# Patient Record
Sex: FEMALE | Race: WHITE | NOT HISPANIC OR LATINO | Employment: OTHER | URBAN - METROPOLITAN AREA
[De-identification: names, ages, dates, MRNs, and addresses within clinical notes are randomized per-mention and may not be internally consistent; named-entity substitution may affect disease eponyms.]

---

## 2017-01-01 ENCOUNTER — APPOINTMENT (OUTPATIENT)
Dept: RADIOLOGY | Facility: MEDICAL CENTER | Age: 67
DRG: 374 | End: 2017-01-01
Attending: HOSPITALIST
Payer: MEDICARE

## 2017-01-01 ENCOUNTER — APPOINTMENT (OUTPATIENT)
Dept: RADIOLOGY | Facility: MEDICAL CENTER | Age: 67
DRG: 374 | End: 2017-01-01
Attending: SURGERY
Payer: MEDICARE

## 2017-01-01 ENCOUNTER — HOSPITAL ENCOUNTER (OUTPATIENT)
Dept: CARDIOLOGY | Facility: MEDICAL CENTER | Age: 67
End: 2017-10-07
Attending: PHYSICIAN ASSISTANT
Payer: MEDICARE

## 2017-01-01 ENCOUNTER — TELEPHONE (OUTPATIENT)
Dept: MEDICAL GROUP | Facility: MEDICAL CENTER | Age: 67
End: 2017-01-01

## 2017-01-01 ENCOUNTER — HOSPITAL ENCOUNTER (INPATIENT)
Facility: MEDICAL CENTER | Age: 67
LOS: 4 days | DRG: 175 | End: 2017-10-24
Attending: EMERGENCY MEDICINE | Admitting: INTERNAL MEDICINE
Payer: MEDICARE

## 2017-01-01 ENCOUNTER — APPOINTMENT (OUTPATIENT)
Dept: RADIOLOGY | Facility: IMAGING CENTER | Age: 67
End: 2017-01-01
Attending: NURSE PRACTITIONER
Payer: MEDICARE

## 2017-01-01 ENCOUNTER — RESOLUTE PROFESSIONAL BILLING HOSPITAL PROF FEE (OUTPATIENT)
Dept: HOSPITALIST | Facility: MEDICAL CENTER | Age: 67
End: 2017-01-01
Payer: MEDICARE

## 2017-01-01 ENCOUNTER — HOSPITAL ENCOUNTER (OUTPATIENT)
Dept: LAB | Facility: MEDICAL CENTER | Age: 67
End: 2017-10-03
Attending: PHYSICIAN ASSISTANT
Payer: MEDICARE

## 2017-01-01 ENCOUNTER — APPOINTMENT (OUTPATIENT)
Dept: RADIOLOGY | Facility: MEDICAL CENTER | Age: 67
DRG: 175 | End: 2017-01-01
Attending: INTERNAL MEDICINE
Payer: MEDICARE

## 2017-01-01 ENCOUNTER — HOSPITAL ENCOUNTER (OUTPATIENT)
Dept: RADIOLOGY | Facility: MEDICAL CENTER | Age: 67
End: 2017-10-03
Attending: PHYSICIAN ASSISTANT
Payer: MEDICARE

## 2017-01-01 ENCOUNTER — HOSPITAL ENCOUNTER (OUTPATIENT)
Dept: LAB | Facility: MEDICAL CENTER | Age: 67
End: 2017-10-16
Attending: INTERNAL MEDICINE
Payer: MEDICARE

## 2017-01-01 ENCOUNTER — TELEPHONE (OUTPATIENT)
Dept: MEDICAL GROUP | Facility: PHYSICIAN GROUP | Age: 67
End: 2017-01-01

## 2017-01-01 ENCOUNTER — OFFICE VISIT (OUTPATIENT)
Dept: MEDICAL GROUP | Facility: MEDICAL CENTER | Age: 67
End: 2017-01-01
Payer: MEDICARE

## 2017-01-01 ENCOUNTER — APPOINTMENT (OUTPATIENT)
Dept: RADIOLOGY | Facility: MEDICAL CENTER | Age: 67
DRG: 374 | End: 2017-01-01
Attending: EMERGENCY MEDICINE
Payer: MEDICARE

## 2017-01-01 ENCOUNTER — HOSPITAL ENCOUNTER (OUTPATIENT)
Dept: LAB | Facility: MEDICAL CENTER | Age: 67
End: 2017-09-26
Attending: PHYSICIAN ASSISTANT
Payer: MEDICARE

## 2017-01-01 ENCOUNTER — HOSPITAL ENCOUNTER (INPATIENT)
Facility: MEDICAL CENTER | Age: 67
LOS: 1 days | DRG: 374 | End: 2017-10-26
Attending: EMERGENCY MEDICINE | Admitting: HOSPITALIST
Payer: MEDICARE

## 2017-01-01 ENCOUNTER — PATIENT OUTREACH (OUTPATIENT)
Dept: HEALTH INFORMATION MANAGEMENT | Facility: OTHER | Age: 67
End: 2017-01-01

## 2017-01-01 ENCOUNTER — OFFICE VISIT (OUTPATIENT)
Dept: URGENT CARE | Facility: PHYSICIAN GROUP | Age: 67
End: 2017-01-01
Payer: MEDICARE

## 2017-01-01 ENCOUNTER — HOSPITAL ENCOUNTER (OUTPATIENT)
Dept: RADIOLOGY | Facility: MEDICAL CENTER | Age: 67
End: 2017-10-20
Attending: INTERNAL MEDICINE
Payer: MEDICARE

## 2017-01-01 VITALS
HEART RATE: 101 BPM | BODY MASS INDEX: 36.07 KG/M2 | HEIGHT: 68 IN | TEMPERATURE: 97.3 F | OXYGEN SATURATION: 97 % | SYSTOLIC BLOOD PRESSURE: 108 MMHG | WEIGHT: 238 LBS | DIASTOLIC BLOOD PRESSURE: 64 MMHG

## 2017-01-01 VITALS
RESPIRATION RATE: 20 BRPM | DIASTOLIC BLOOD PRESSURE: 68 MMHG | BODY MASS INDEX: 34.4 KG/M2 | TEMPERATURE: 99 F | HEIGHT: 68 IN | HEART RATE: 104 BPM | OXYGEN SATURATION: 95 % | SYSTOLIC BLOOD PRESSURE: 110 MMHG | WEIGHT: 227 LBS

## 2017-01-01 VITALS
HEART RATE: 86 BPM | OXYGEN SATURATION: 93 % | TEMPERATURE: 97.7 F | RESPIRATION RATE: 18 BRPM | DIASTOLIC BLOOD PRESSURE: 55 MMHG | SYSTOLIC BLOOD PRESSURE: 92 MMHG | WEIGHT: 231.48 LBS | HEIGHT: 68 IN | BODY MASS INDEX: 35.08 KG/M2

## 2017-01-01 VITALS
DIASTOLIC BLOOD PRESSURE: 60 MMHG | RESPIRATION RATE: 12 BRPM | TEMPERATURE: 98.3 F | HEIGHT: 68 IN | OXYGEN SATURATION: 97 % | HEART RATE: 74 BPM | BODY MASS INDEX: 36.07 KG/M2 | SYSTOLIC BLOOD PRESSURE: 126 MMHG | WEIGHT: 238 LBS

## 2017-01-01 VITALS
RESPIRATION RATE: 16 BRPM | DIASTOLIC BLOOD PRESSURE: 64 MMHG | BODY MASS INDEX: 35.64 KG/M2 | HEIGHT: 68 IN | HEART RATE: 100 BPM | WEIGHT: 235.2 LBS | OXYGEN SATURATION: 93 % | SYSTOLIC BLOOD PRESSURE: 102 MMHG | TEMPERATURE: 98.8 F

## 2017-01-01 VITALS
HEART RATE: 98 BPM | WEIGHT: 266.76 LBS | SYSTOLIC BLOOD PRESSURE: 86 MMHG | RESPIRATION RATE: 29 BRPM | BODY MASS INDEX: 40.43 KG/M2 | OXYGEN SATURATION: 97 % | HEIGHT: 68 IN | TEMPERATURE: 97.9 F | DIASTOLIC BLOOD PRESSURE: 27 MMHG

## 2017-01-01 DIAGNOSIS — R74.8 ELEVATED ALKALINE PHOSPHATASE LEVEL: ICD-10-CM

## 2017-01-01 DIAGNOSIS — R74.01 TRANSAMINITIS: ICD-10-CM

## 2017-01-01 DIAGNOSIS — R73.03 PREDIABETES: ICD-10-CM

## 2017-01-01 DIAGNOSIS — S51.001A OPEN WOUND OF RIGHT ELBOW, INITIAL ENCOUNTER: ICD-10-CM

## 2017-01-01 DIAGNOSIS — R60.9 EDEMA, UNSPECIFIED TYPE: ICD-10-CM

## 2017-01-01 DIAGNOSIS — S50.01XA CONTUSION OF ELBOW, RIGHT: ICD-10-CM

## 2017-01-01 DIAGNOSIS — R60.0 LOWER LEG EDEMA: ICD-10-CM

## 2017-01-01 DIAGNOSIS — R10.9 STOMACH ACHE: ICD-10-CM

## 2017-01-01 DIAGNOSIS — I26.99 OTHER ACUTE PULMONARY EMBOLISM WITHOUT ACUTE COR PULMONALE (HCC): ICD-10-CM

## 2017-01-01 DIAGNOSIS — C79.9 METASTATIC CANCER (HCC): ICD-10-CM

## 2017-01-01 DIAGNOSIS — Z12.31 ENCOUNTER FOR SCREENING MAMMOGRAM FOR MALIGNANT NEOPLASM OF BREAST: ICD-10-CM

## 2017-01-01 DIAGNOSIS — Z12.11 SCREENING FOR MALIGNANT NEOPLASM OF COLON: ICD-10-CM

## 2017-01-01 DIAGNOSIS — Z48.02 ENCOUNTER FOR REMOVAL OF SUTURES: ICD-10-CM

## 2017-01-01 DIAGNOSIS — M25.521 ELBOW PAIN, RIGHT: ICD-10-CM

## 2017-01-01 DIAGNOSIS — H35.30 MACULAR DEGENERATION: ICD-10-CM

## 2017-01-01 DIAGNOSIS — M89.9 OSTEOPATHY: ICD-10-CM
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
## 2017-01-01 DIAGNOSIS — J98.4 DISEASE OF LUNG: ICD-10-CM

## 2017-01-01 LAB
A1AT SERPL-MCNC: 291 MG/DL (ref 90–200)
AFP-TM SERPL-MCNC: 2 NG/ML (ref 0–9)
ALBUMIN SERPL BCP-MCNC: 2 G/DL (ref 3.2–4.9)
ALBUMIN SERPL BCP-MCNC: 2.2 G/DL (ref 3.2–4.9)
ALBUMIN SERPL BCP-MCNC: 2.3 G/DL (ref 3.2–4.9)
ALBUMIN SERPL BCP-MCNC: 2.7 G/DL (ref 3.2–4.9)
ALBUMIN SERPL BCP-MCNC: 2.9 G/DL (ref 3.2–4.9)
ALBUMIN/GLOB SERPL: 0.6 G/DL
ALBUMIN/GLOB SERPL: 0.8 G/DL
ALBUMIN/GLOB SERPL: 0.9 G/DL
ALP SERPL-CCNC: 527 U/L (ref 30–99)
ALP SERPL-CCNC: 558 U/L (ref 30–99)
ALP SERPL-CCNC: 589 U/L (ref 30–99)
ALP SERPL-CCNC: 590 U/L (ref 30–99)
ALP SERPL-CCNC: 613 U/L (ref 30–99)
ALP SERPL-CCNC: 752 U/L (ref 30–99)
ALP SERPL-CCNC: 857 U/L (ref 30–99)
ALT SERPL-CCNC: 190 U/L (ref 2–50)
ALT SERPL-CCNC: 47 U/L (ref 2–50)
ALT SERPL-CCNC: 67 U/L (ref 2–50)
ALT SERPL-CCNC: 706 U/L (ref 2–50)
ALT SERPL-CCNC: 784 U/L (ref 2–50)
ALT SERPL-CCNC: 87 U/L (ref 2–50)
ALT SERPL-CCNC: 89 U/L (ref 2–50)
AMMONIA PLAS-SCNC: 93 UMOL/L (ref 11–45)
ANION GAP SERPL CALC-SCNC: 13 MMOL/L (ref 0–11.9)
ANION GAP SERPL CALC-SCNC: 14 MMOL/L (ref 0–11.9)
ANION GAP SERPL CALC-SCNC: 14 MMOL/L (ref 0–11.9)
ANION GAP SERPL CALC-SCNC: 15 MMOL/L (ref 0–11.9)
ANION GAP SERPL CALC-SCNC: 18 MMOL/L (ref 0–11.9)
ANION GAP SERPL CALC-SCNC: 23 MMOL/L (ref 0–11.9)
ANION GAP SERPL CALC-SCNC: 9 MMOL/L (ref 0–11.9)
ANISOCYTOSIS BLD QL SMEAR: ABNORMAL
APPEARANCE UR: CLEAR
APTT PPP: 28.4 SEC (ref 24.7–36)
APTT PPP: 72.8 SEC (ref 24.7–36)
AST SERPL-CCNC: 150 U/L (ref 12–45)
AST SERPL-CCNC: 217 U/L (ref 12–45)
AST SERPL-CCNC: 228 U/L (ref 12–45)
AST SERPL-CCNC: 249 U/L (ref 12–45)
AST SERPL-CCNC: 2803 U/L (ref 12–45)
AST SERPL-CCNC: 2843 U/L (ref 12–45)
AST SERPL-CCNC: 750 U/L (ref 12–45)
BACTERIA #/AREA URNS HPF: ABNORMAL /HPF
BASOPHILS # BLD AUTO: 0 % (ref 0–1.8)
BASOPHILS # BLD AUTO: 0.3 % (ref 0–1.8)
BASOPHILS # BLD AUTO: 0.4 % (ref 0–1.8)
BASOPHILS # BLD AUTO: 0.9 % (ref 0–1.8)
BASOPHILS # BLD: 0 K/UL (ref 0–0.12)
BASOPHILS # BLD: 0.05 K/UL (ref 0–0.12)
BASOPHILS # BLD: 0.06 K/UL (ref 0–0.12)
BASOPHILS # BLD: 0.24 K/UL (ref 0–0.12)
BILIRUB SERPL-MCNC: 1 MG/DL (ref 0.1–1.5)
BILIRUB SERPL-MCNC: 10.3 MG/DL (ref 0.1–1.5)
BILIRUB SERPL-MCNC: 4.3 MG/DL (ref 0.1–1.5)
BILIRUB SERPL-MCNC: 6.8 MG/DL (ref 0.1–1.5)
BILIRUB SERPL-MCNC: 6.8 MG/DL (ref 0.1–1.5)
BILIRUB SERPL-MCNC: 7.9 MG/DL (ref 0.1–1.5)
BILIRUB SERPL-MCNC: 8.8 MG/DL (ref 0.1–1.5)
BILIRUB UR QL STRIP.AUTO: NEGATIVE
BNP SERPL-MCNC: 104 PG/ML (ref 0–100)
BUN SERPL-MCNC: 12 MG/DL (ref 8–22)
BUN SERPL-MCNC: 22 MG/DL (ref 8–22)
BUN SERPL-MCNC: 29 MG/DL (ref 8–22)
BUN SERPL-MCNC: 29 MG/DL (ref 8–22)
BUN SERPL-MCNC: 41 MG/DL (ref 8–22)
BUN SERPL-MCNC: 59 MG/DL (ref 8–22)
BUN SERPL-MCNC: 61 MG/DL (ref 8–22)
BURR CELLS BLD QL SMEAR: NORMAL
BURR CELLS BLD QL SMEAR: NORMAL
CALCIUM SERPL-MCNC: 6.5 MG/DL (ref 8.5–10.5)
CALCIUM SERPL-MCNC: 6.8 MG/DL (ref 8.5–10.5)
CALCIUM SERPL-MCNC: 8.3 MG/DL (ref 8.4–10.2)
CALCIUM SERPL-MCNC: 8.6 MG/DL (ref 8.4–10.2)
CALCIUM SERPL-MCNC: 8.6 MG/DL (ref 8.4–10.2)
CALCIUM SERPL-MCNC: 8.7 MG/DL (ref 8.5–10.5)
CALCIUM SERPL-MCNC: 8.9 MG/DL (ref 8.5–10.5)
CEA SERPL-MCNC: 1586.7 NG/ML (ref 0–3)
CHLORIDE SERPL-SCNC: 103 MMOL/L (ref 96–112)
CHLORIDE SERPL-SCNC: 92 MMOL/L (ref 96–112)
CHLORIDE SERPL-SCNC: 94 MMOL/L (ref 96–112)
CHLORIDE SERPL-SCNC: 95 MMOL/L (ref 96–112)
CHLORIDE SERPL-SCNC: 97 MMOL/L (ref 96–112)
CHLORIDE SERPL-SCNC: 97 MMOL/L (ref 96–112)
CHLORIDE SERPL-SCNC: 98 MMOL/L (ref 96–112)
CHOLEST SERPL-MCNC: 289 MG/DL (ref 100–199)
CK SERPL-CCNC: 1264 U/L (ref 0–154)
CK SERPL-CCNC: 1277 U/L (ref 0–154)
CO2 SERPL-SCNC: 13 MMOL/L (ref 20–33)
CO2 SERPL-SCNC: 14 MMOL/L (ref 20–33)
CO2 SERPL-SCNC: 22 MMOL/L (ref 20–33)
CO2 SERPL-SCNC: 22 MMOL/L (ref 20–33)
CO2 SERPL-SCNC: 25 MMOL/L (ref 20–33)
CO2 SERPL-SCNC: 26 MMOL/L (ref 20–33)
CO2 SERPL-SCNC: 26 MMOL/L (ref 20–33)
COLOR UR: ABNORMAL
CORTIS SERPL-MCNC: 35.2 UG/DL (ref 0–23)
CREAT SERPL-MCNC: 0.63 MG/DL (ref 0.5–1.4)
CREAT SERPL-MCNC: 0.8 MG/DL (ref 0.5–1.4)
CREAT SERPL-MCNC: 1.03 MG/DL (ref 0.5–1.4)
CREAT SERPL-MCNC: 1.22 MG/DL (ref 0.5–1.4)
CREAT SERPL-MCNC: 1.32 MG/DL (ref 0.5–1.4)
CREAT SERPL-MCNC: 2.8 MG/DL (ref 0.5–1.4)
CREAT SERPL-MCNC: 3.24 MG/DL (ref 0.5–1.4)
EKG IMPRESSION: NORMAL
EKG IMPRESSION: NORMAL
EOSINOPHIL # BLD AUTO: 0 K/UL (ref 0–0.51)
EOSINOPHIL # BLD AUTO: 0 K/UL (ref 0–0.51)
EOSINOPHIL # BLD AUTO: 0.06 K/UL (ref 0–0.51)
EOSINOPHIL # BLD AUTO: 0.08 K/UL (ref 0–0.51)
EOSINOPHIL # BLD AUTO: 0.18 K/UL (ref 0–0.51)
EOSINOPHIL # BLD AUTO: 0.24 K/UL (ref 0–0.51)
EOSINOPHIL NFR BLD: 0 % (ref 0–6.9)
EOSINOPHIL NFR BLD: 0 % (ref 0–6.9)
EOSINOPHIL NFR BLD: 0.4 % (ref 0–6.9)
EOSINOPHIL NFR BLD: 0.5 % (ref 0–6.9)
EOSINOPHIL NFR BLD: 0.9 % (ref 0–6.9)
EOSINOPHIL NFR BLD: 1 % (ref 0–6.9)
EPI CELLS #/AREA URNS HPF: NEGATIVE /HPF
ERYTHROCYTE [DISTWIDTH] IN BLOOD BY AUTOMATED COUNT: 54 FL (ref 35.9–50)
ERYTHROCYTE [DISTWIDTH] IN BLOOD BY AUTOMATED COUNT: 54.1 FL (ref 35.9–50)
ERYTHROCYTE [DISTWIDTH] IN BLOOD BY AUTOMATED COUNT: 55.8 FL (ref 35.9–50)
ERYTHROCYTE [DISTWIDTH] IN BLOOD BY AUTOMATED COUNT: 55.8 FL (ref 35.9–50)
ERYTHROCYTE [DISTWIDTH] IN BLOOD BY AUTOMATED COUNT: 56.4 FL (ref 35.9–50)
ERYTHROCYTE [DISTWIDTH] IN BLOOD BY AUTOMATED COUNT: 58.6 FL (ref 35.9–50)
ERYTHROCYTE [DISTWIDTH] IN BLOOD BY AUTOMATED COUNT: 59.8 FL (ref 35.9–50)
FERRITIN SERPL-MCNC: 21.7 NG/ML (ref 10–291)
GFR SERPL CREATININE-BSD FRML MDRD: 14 ML/MIN/1.73 M 2
GFR SERPL CREATININE-BSD FRML MDRD: 17 ML/MIN/1.73 M 2
GFR SERPL CREATININE-BSD FRML MDRD: 40 ML/MIN/1.73 M 2
GFR SERPL CREATININE-BSD FRML MDRD: 44 ML/MIN/1.73 M 2
GFR SERPL CREATININE-BSD FRML MDRD: 53 ML/MIN/1.73 M 2
GFR SERPL CREATININE-BSD FRML MDRD: >60 ML/MIN/1.73 M 2
GFR SERPL CREATININE-BSD FRML MDRD: >60 ML/MIN/1.73 M 2
GLOBULIN SER CALC-MCNC: 2.5 G/DL (ref 1.9–3.5)
GLOBULIN SER CALC-MCNC: 2.7 G/DL (ref 1.9–3.5)
GLOBULIN SER CALC-MCNC: 3.2 G/DL (ref 1.9–3.5)
GLOBULIN SER CALC-MCNC: 3.4 G/DL (ref 1.9–3.5)
GLOBULIN SER CALC-MCNC: 3.6 G/DL (ref 1.9–3.5)
GLOBULIN SER CALC-MCNC: 3.7 G/DL (ref 1.9–3.5)
GLOBULIN SER CALC-MCNC: 3.8 G/DL (ref 1.9–3.5)
GLUCOSE SERPL-MCNC: 102 MG/DL (ref 65–99)
GLUCOSE SERPL-MCNC: 105 MG/DL (ref 65–99)
GLUCOSE SERPL-MCNC: 78 MG/DL (ref 65–99)
GLUCOSE SERPL-MCNC: 80 MG/DL (ref 65–99)
GLUCOSE SERPL-MCNC: 83 MG/DL (ref 65–99)
GLUCOSE SERPL-MCNC: 93 MG/DL (ref 65–99)
GLUCOSE SERPL-MCNC: 97 MG/DL (ref 65–99)
GLUCOSE UR STRIP.AUTO-MCNC: NEGATIVE MG/DL
HAV AB SER QL IA: POSITIVE
HBA1C MFR BLD: 5.6 % (ref ?–5.8)
HBV SURFACE AB SERPL IA-ACNC: <3.1 MIU/ML (ref 0–10)
HBV SURFACE AG SER QL: NEGATIVE
HCT VFR BLD AUTO: 29.4 % (ref 37–47)
HCT VFR BLD AUTO: 29.7 % (ref 37–47)
HCT VFR BLD AUTO: 30.6 % (ref 37–47)
HCT VFR BLD AUTO: 31 % (ref 37–47)
HCT VFR BLD AUTO: 31.1 % (ref 37–47)
HCT VFR BLD AUTO: 31.7 % (ref 37–47)
HCT VFR BLD AUTO: 31.8 % (ref 37–47)
HCV AB SER QL: NEGATIVE
HDLC SERPL-MCNC: 23 MG/DL
HGB BLD-MCNC: 8.9 G/DL (ref 12–16)
HGB BLD-MCNC: 8.9 G/DL (ref 12–16)
HGB BLD-MCNC: 9.1 G/DL (ref 12–16)
HGB BLD-MCNC: 9.3 G/DL (ref 12–16)
HGB BLD-MCNC: 9.3 G/DL (ref 12–16)
HGB BLD-MCNC: 9.4 G/DL (ref 12–16)
HGB BLD-MCNC: 9.6 G/DL (ref 12–16)
HGB RETIC QN AUTO: 24.8 PG/CELL (ref 29–35)
HYALINE CASTS #/AREA URNS LPF: ABNORMAL /LPF
HYPOCHROMIA BLD QL SMEAR: ABNORMAL
IGA SERPL-MCNC: 287 MG/DL (ref 68–408)
IMM GRANULOCYTES # BLD AUTO: 0.24 K/UL (ref 0–0.11)
IMM GRANULOCYTES # BLD AUTO: 0.55 K/UL (ref 0–0.11)
IMM GRANULOCYTES NFR BLD AUTO: 1.4 % (ref 0–0.9)
IMM GRANULOCYTES NFR BLD AUTO: 3.3 % (ref 0–0.9)
IMM RETICS NFR: 36.4 % (ref 9.3–17.4)
INR PPP: 1.24 (ref 0.87–1.13)
INR PPP: 1.3 (ref 0.87–1.13)
INR PPP: 1.52 (ref 0.87–1.13)
INT CON NEG: NORMAL
INT CON POS: NORMAL
IRON SATN MFR SERPL: 6 % (ref 15–55)
IRON SERPL-MCNC: 22 UG/DL (ref 40–170)
KETONES UR STRIP.AUTO-MCNC: NEGATIVE MG/DL
LACTATE BLD-SCNC: 5 MMOL/L (ref 0.5–2)
LACTATE BLD-SCNC: 5.5 MMOL/L (ref 0.5–2)
LACTATE BLD-SCNC: 5.7 MMOL/L (ref 0.5–2)
LACTATE BLD-SCNC: 6.4 MMOL/L (ref 0.5–2)
LACTATE BLD-SCNC: 8.4 MMOL/L (ref 0.5–2)
LDLC SERPL CALC-MCNC: 236 MG/DL
LEUKOCYTE ESTERASE UR QL STRIP.AUTO: NEGATIVE
LIPASE SERPL-CCNC: 48 U/L (ref 11–82)
LV EJECT FRACT  99904: 65
LV EJECT FRACT MOD 2C 99903: 40.54
LV EJECT FRACT MOD 4C 99902: 73.82
LV EJECT FRACT MOD BP 99901: 61.74
LYMPHOCYTES # BLD AUTO: 0.65 K/UL (ref 1–4.8)
LYMPHOCYTES # BLD AUTO: 0.83 K/UL (ref 1–4.8)
LYMPHOCYTES # BLD AUTO: 0.99 K/UL (ref 1–4.8)
LYMPHOCYTES # BLD AUTO: 1.95 K/UL (ref 1–4.8)
LYMPHOCYTES # BLD AUTO: 2.18 K/UL (ref 1–4.8)
LYMPHOCYTES # BLD AUTO: 2.59 K/UL (ref 1–4.8)
LYMPHOCYTES NFR BLD: 12 % (ref 22–41)
LYMPHOCYTES NFR BLD: 5 % (ref 22–41)
LYMPHOCYTES NFR BLD: 5.3 % (ref 22–41)
LYMPHOCYTES NFR BLD: 5.9 % (ref 22–41)
LYMPHOCYTES NFR BLD: 8 % (ref 22–41)
LYMPHOCYTES NFR BLD: 9.6 % (ref 22–41)
MACROCYTES BLD QL SMEAR: ABNORMAL
MAGNESIUM SERPL-MCNC: 2.1 MG/DL (ref 1.5–2.5)
MANUAL DIFF BLD: ABNORMAL
MANUAL DIFF BLD: NORMAL
MCH RBC QN AUTO: 23.2 PG (ref 27–33)
MCH RBC QN AUTO: 23.3 PG (ref 27–33)
MCH RBC QN AUTO: 23.3 PG (ref 27–33)
MCH RBC QN AUTO: 23.6 PG (ref 27–33)
MCH RBC QN AUTO: 23.7 PG (ref 27–33)
MCH RBC QN AUTO: 23.8 PG (ref 27–33)
MCH RBC QN AUTO: 24.3 PG (ref 27–33)
MCHC RBC AUTO-ENTMCNC: 28.7 G/DL (ref 33.6–35)
MCHC RBC AUTO-ENTMCNC: 29.2 G/DL (ref 33.6–35)
MCHC RBC AUTO-ENTMCNC: 30.2 G/DL (ref 33.6–35)
MCHC RBC AUTO-ENTMCNC: 30.3 G/DL (ref 33.6–35)
MCHC RBC AUTO-ENTMCNC: 30.3 G/DL (ref 33.6–35)
MCHC RBC AUTO-ENTMCNC: 30.4 G/DL (ref 33.6–35)
MCHC RBC AUTO-ENTMCNC: 30.6 G/DL (ref 33.6–35)
MCV RBC AUTO: 76.2 FL (ref 81.4–97.8)
MCV RBC AUTO: 76.8 FL (ref 81.4–97.8)
MCV RBC AUTO: 77.9 FL (ref 81.4–97.8)
MCV RBC AUTO: 77.9 FL (ref 81.4–97.8)
MCV RBC AUTO: 79.5 FL (ref 81.4–97.8)
MCV RBC AUTO: 80.1 FL (ref 81.4–97.8)
MCV RBC AUTO: 82.9 FL (ref 81.4–97.8)
METAMYELOCYTES NFR BLD MANUAL: 1 %
METAMYELOCYTES NFR BLD MANUAL: 2 %
METAMYELOCYTES NFR BLD MANUAL: 2.6 %
MICRO URNS: ABNORMAL
MITOCHONDRIA M2 IGG SER-ACNC: 3.1 UNITS (ref 0–20)
MONOCYTES # BLD AUTO: 0.65 K/UL (ref 0–0.85)
MONOCYTES # BLD AUTO: 0.91 K/UL (ref 0–0.85)
MONOCYTES # BLD AUTO: 1.16 K/UL (ref 0–0.85)
MONOCYTES # BLD AUTO: 1.27 K/UL (ref 0–0.85)
MONOCYTES # BLD AUTO: 1.62 K/UL (ref 0–0.85)
MONOCYTES # BLD AUTO: 1.71 K/UL (ref 0–0.85)
MONOCYTES NFR BLD AUTO: 4.3 % (ref 0–13.4)
MONOCYTES NFR BLD AUTO: 5 % (ref 0–13.4)
MONOCYTES NFR BLD AUTO: 5.3 % (ref 0–13.4)
MONOCYTES NFR BLD AUTO: 7 % (ref 0–13.4)
MONOCYTES NFR BLD AUTO: 7.7 % (ref 0–13.4)
MONOCYTES NFR BLD AUTO: 9.7 % (ref 0–13.4)
MORPHOLOGY BLD-IMP: NORMAL
MORPHOLOGY BLD-IMP: NORMAL
MYELOCYTES NFR BLD MANUAL: 1 %
MYELOCYTES NFR BLD MANUAL: 1.7 %
NEUTROPHILS # BLD AUTO: 10.79 K/UL (ref 2–7.15)
NEUTROPHILS # BLD AUTO: 13.78 K/UL (ref 2–7.15)
NEUTROPHILS # BLD AUTO: 13.81 K/UL (ref 2–7.15)
NEUTROPHILS # BLD AUTO: 14.74 K/UL (ref 2–7.15)
NEUTROPHILS # BLD AUTO: 20.01 K/UL (ref 2–7.15)
NEUTROPHILS # BLD AUTO: 21.36 K/UL (ref 2–7.15)
NEUTROPHILS NFR BLD: 72.2 % (ref 44–72)
NEUTROPHILS NFR BLD: 74 % (ref 44–72)
NEUTROPHILS NFR BLD: 76 % (ref 44–72)
NEUTROPHILS NFR BLD: 82.2 % (ref 44–72)
NEUTROPHILS NFR BLD: 83.2 % (ref 44–72)
NEUTROPHILS NFR BLD: 87.7 % (ref 44–72)
NEUTS BAND NFR BLD MANUAL: 5 % (ref 0–10)
NEUTS BAND NFR BLD MANUAL: 6.9 % (ref 0–10)
NEUTS BAND NFR BLD MANUAL: 8 % (ref 0–10)
NITRITE UR QL STRIP.AUTO: NEGATIVE
NRBC # BLD AUTO: 0 K/UL
NRBC # BLD AUTO: 0.52 K/UL
NRBC # BLD AUTO: 2.25 K/UL
NRBC BLD AUTO-RTO: 0 /100 WBC
NRBC BLD AUTO-RTO: 2.1 /100 WBC
NRBC BLD AUTO-RTO: 8.3 /100 WBC
NUCLEAR IGG SER QL IA: NORMAL
PATHOLOGY CONSULT NOTE: NORMAL
PH UR STRIP.AUTO: 7 [PH]
PLATELET # BLD AUTO: 210 K/UL (ref 164–446)
PLATELET # BLD AUTO: 224 K/UL (ref 164–446)
PLATELET # BLD AUTO: 226 K/UL (ref 164–446)
PLATELET # BLD AUTO: 244 K/UL (ref 164–446)
PLATELET # BLD AUTO: 264 K/UL (ref 164–446)
PLATELET # BLD AUTO: 266 K/UL (ref 164–446)
PLATELET # BLD AUTO: 305 K/UL (ref 164–446)
PLATELET BLD QL SMEAR: NORMAL
PMV BLD AUTO: 10 FL (ref 9–12.9)
PMV BLD AUTO: 10.1 FL (ref 9–12.9)
PMV BLD AUTO: 10.1 FL (ref 9–12.9)
PMV BLD AUTO: 10.6 FL (ref 9–12.9)
PMV BLD AUTO: 9.5 FL (ref 9–12.9)
PMV BLD AUTO: 9.5 FL (ref 9–12.9)
PMV BLD AUTO: 9.7 FL (ref 9–12.9)
POIKILOCYTOSIS BLD QL SMEAR: NORMAL
POIKILOCYTOSIS BLD QL SMEAR: NORMAL
POLYCHROMASIA BLD QL SMEAR: NORMAL
POTASSIUM SERPL-SCNC: 3.7 MMOL/L (ref 3.6–5.5)
POTASSIUM SERPL-SCNC: 3.8 MMOL/L (ref 3.6–5.5)
POTASSIUM SERPL-SCNC: 3.9 MMOL/L (ref 3.6–5.5)
POTASSIUM SERPL-SCNC: 4.3 MMOL/L (ref 3.6–5.5)
POTASSIUM SERPL-SCNC: 4.3 MMOL/L (ref 3.6–5.5)
POTASSIUM SERPL-SCNC: 5.3 MMOL/L (ref 3.6–5.5)
POTASSIUM SERPL-SCNC: 5.5 MMOL/L (ref 3.6–5.5)
PROMYELOCYTES NFR BLD MANUAL: 0.9 %
PROMYELOCYTES NFR BLD MANUAL: 1.7 %
PROT SERPL-MCNC: 4.7 G/DL (ref 6–8.2)
PROT SERPL-MCNC: 5 G/DL (ref 6–8.2)
PROT SERPL-MCNC: 5.6 G/DL (ref 6–8.2)
PROT SERPL-MCNC: 5.9 G/DL (ref 6–8.2)
PROT SERPL-MCNC: 6 G/DL (ref 6–8.2)
PROT SERPL-MCNC: 6.1 G/DL (ref 6–8.2)
PROT SERPL-MCNC: 6.3 G/DL (ref 6–8.2)
PROT UR QL STRIP: 300 MG/DL
PROTHROMBIN TIME: 16 SEC (ref 12–14.6)
PROTHROMBIN TIME: 16 SEC (ref 12–14.6)
PROTHROMBIN TIME: 18.1 SEC (ref 12–14.6)
RBC # BLD AUTO: 3.67 M/UL (ref 4.2–5.4)
RBC # BLD AUTO: 3.74 M/UL (ref 4.2–5.4)
RBC # BLD AUTO: 3.9 M/UL (ref 4.2–5.4)
RBC # BLD AUTO: 3.93 M/UL (ref 4.2–5.4)
RBC # BLD AUTO: 3.99 M/UL (ref 4.2–5.4)
RBC # BLD AUTO: 4 M/UL (ref 4.2–5.4)
RBC # BLD AUTO: 4.13 M/UL (ref 4.2–5.4)
RBC # URNS HPF: ABNORMAL /HPF
RBC BLD AUTO: PRESENT
RBC UR QL AUTO: ABNORMAL
RETICS # AUTO: 0.15 M/UL (ref 0.04–0.06)
RETICS/RBC NFR: 3.9 % (ref 0.8–2.1)
SCHISTOCYTES BLD QL SMEAR: NORMAL
SMA IGG SER-ACNC: 9 UNITS (ref 0–19)
SODIUM SERPL-SCNC: 129 MMOL/L (ref 135–145)
SODIUM SERPL-SCNC: 130 MMOL/L (ref 135–145)
SODIUM SERPL-SCNC: 132 MMOL/L (ref 135–145)
SODIUM SERPL-SCNC: 132 MMOL/L (ref 135–145)
SODIUM SERPL-SCNC: 133 MMOL/L (ref 135–145)
SODIUM SERPL-SCNC: 137 MMOL/L (ref 135–145)
SODIUM SERPL-SCNC: 137 MMOL/L (ref 135–145)
SP GR UR STRIP.AUTO: 1.01
TIBC SERPL-MCNC: 339 UG/DL (ref 250–450)
TRIGL SERPL-MCNC: 150 MG/DL (ref 0–149)
TROPONIN I SERPL-MCNC: 0.04 NG/ML (ref 0–0.04)
TSH SERPL DL<=0.005 MIU/L-ACNC: 3.16 UIU/ML (ref 0.3–3.7)
TTG IGA SER IA-ACNC: 1 U/ML (ref 0–3)
UROBILINOGEN UR STRIP.AUTO-MCNC: 0.2 MG/DL
WBC # BLD AUTO: 12.3 K/UL (ref 4.8–10.8)
WBC # BLD AUTO: 16.6 K/UL (ref 4.8–10.8)
WBC # BLD AUTO: 16.8 K/UL (ref 4.8–10.8)
WBC # BLD AUTO: 18.2 K/UL (ref 4.8–10.8)
WBC # BLD AUTO: 20.5 K/UL (ref 4.8–10.8)
WBC # BLD AUTO: 24.4 K/UL (ref 4.8–10.8)
WBC # BLD AUTO: 27 K/UL (ref 4.8–10.8)
WBC #/AREA URNS HPF: ABNORMAL /HPF

## 2017-01-01 PROCEDURE — 99203 OFFICE O/P NEW LOW 30 MIN: CPT | Performed by: PHYSICIAN ASSISTANT

## 2017-01-01 PROCEDURE — 3E013GC INTRODUCTION OF OTHER THERAPEUTIC SUBSTANCE INTO SUBCUTANEOUS TISSUE, PERCUTANEOUS APPROACH: ICD-10-PCS | Performed by: INTERNAL MEDICINE

## 2017-01-01 PROCEDURE — 99232 SBSQ HOSP IP/OBS MODERATE 35: CPT | Performed by: HOSPITALIST

## 2017-01-01 PROCEDURE — 86255 FLUORESCENT ANTIBODY SCREEN: CPT

## 2017-01-01 PROCEDURE — 02HV33Z INSERTION OF INFUSION DEVICE INTO SUPERIOR VENA CAVA, PERCUTANEOUS APPROACH: ICD-10-PCS | Performed by: SURGERY

## 2017-01-01 PROCEDURE — 770020 HCHG ROOM/CARE - TELE (206)

## 2017-01-01 PROCEDURE — 700105 HCHG RX REV CODE 258: Performed by: INTERNAL MEDICINE

## 2017-01-01 PROCEDURE — 85027 COMPLETE CBC AUTOMATED: CPT

## 2017-01-01 PROCEDURE — 99233 SBSQ HOSP IP/OBS HIGH 50: CPT | Performed by: HOSPITALIST

## 2017-01-01 PROCEDURE — A9270 NON-COVERED ITEM OR SERVICE: HCPCS | Performed by: HOSPITALIST

## 2017-01-01 PROCEDURE — 82550 ASSAY OF CK (CPK): CPT

## 2017-01-01 PROCEDURE — 84443 ASSAY THYROID STIM HORMONE: CPT

## 2017-01-01 PROCEDURE — 85007 BL SMEAR W/DIFF WBC COUNT: CPT

## 2017-01-01 PROCEDURE — 82378 CARCINOEMBRYONIC ANTIGEN: CPT | Mod: GA

## 2017-01-01 PROCEDURE — 97535 SELF CARE MNGMENT TRAINING: CPT

## 2017-01-01 PROCEDURE — C1751 CATH, INF, PER/CENT/MIDLINE: HCPCS | Performed by: EMERGENCY MEDICINE

## 2017-01-01 PROCEDURE — 80053 COMPREHEN METABOLIC PANEL: CPT

## 2017-01-01 PROCEDURE — 700102 HCHG RX REV CODE 250 W/ 637 OVERRIDE(OP): Performed by: HOSPITALIST

## 2017-01-01 PROCEDURE — 76705 ECHO EXAM OF ABDOMEN: CPT

## 2017-01-01 PROCEDURE — 84484 ASSAY OF TROPONIN QUANT: CPT

## 2017-01-01 PROCEDURE — 85025 COMPLETE CBC W/AUTO DIFF WBC: CPT

## 2017-01-01 PROCEDURE — 700111 HCHG RX REV CODE 636 W/ 250 OVERRIDE (IP): Performed by: INTERNAL MEDICINE

## 2017-01-01 PROCEDURE — 700111 HCHG RX REV CODE 636 W/ 250 OVERRIDE (IP): Performed by: EMERGENCY MEDICINE

## 2017-01-01 PROCEDURE — 99291 CRITICAL CARE FIRST HOUR: CPT | Performed by: HOSPITALIST

## 2017-01-01 PROCEDURE — A9270 NON-COVERED ITEM OR SERVICE: HCPCS | Performed by: INTERNAL MEDICINE

## 2017-01-01 PROCEDURE — 700105 HCHG RX REV CODE 258: Performed by: EMERGENCY MEDICINE

## 2017-01-01 PROCEDURE — 96375 TX/PRO/DX INJ NEW DRUG ADDON: CPT

## 2017-01-01 PROCEDURE — 700101 HCHG RX REV CODE 250: Performed by: HOSPITALIST

## 2017-01-01 PROCEDURE — 94760 N-INVAS EAR/PLS OXIMETRY 1: CPT

## 2017-01-01 PROCEDURE — 82105 ALPHA-FETOPROTEIN SERUM: CPT | Mod: GA

## 2017-01-01 PROCEDURE — 74178 CT ABD&PLV WO CNTR FLWD CNTR: CPT

## 2017-01-01 PROCEDURE — 85730 THROMBOPLASTIN TIME PARTIAL: CPT

## 2017-01-01 PROCEDURE — 83550 IRON BINDING TEST: CPT

## 2017-01-01 PROCEDURE — 88360 TUMOR IMMUNOHISTOCHEM/MANUAL: CPT

## 2017-01-01 PROCEDURE — 86708 HEPATITIS A ANTIBODY: CPT

## 2017-01-01 PROCEDURE — 700105 HCHG RX REV CODE 258: Performed by: PHARMACIST

## 2017-01-01 PROCEDURE — 36415 COLL VENOUS BLD VENIPUNCTURE: CPT

## 2017-01-01 PROCEDURE — 83036 HEMOGLOBIN GLYCOSYLATED A1C: CPT | Performed by: PHYSICIAN ASSISTANT

## 2017-01-01 PROCEDURE — 160048 HCHG OR STATISTICAL LEVEL 1-5: Performed by: INTERNAL MEDICINE

## 2017-01-01 PROCEDURE — 88305 TISSUE EXAM BY PATHOLOGIST: CPT

## 2017-01-01 PROCEDURE — 88341 IMHCHEM/IMCYTCHM EA ADD ANTB: CPT | Mod: 91

## 2017-01-01 PROCEDURE — 82728 ASSAY OF FERRITIN: CPT

## 2017-01-01 PROCEDURE — 82103 ALPHA-1-ANTITRYPSIN TOTAL: CPT

## 2017-01-01 PROCEDURE — 700111 HCHG RX REV CODE 636 W/ 250 OVERRIDE (IP)

## 2017-01-01 PROCEDURE — 82784 ASSAY IGA/IGD/IGG/IGM EACH: CPT

## 2017-01-01 PROCEDURE — 71010 DX-CHEST-PORTABLE (1 VIEW): CPT

## 2017-01-01 PROCEDURE — 160035 HCHG PACU - 1ST 60 MINS PHASE I: Performed by: INTERNAL MEDICINE

## 2017-01-01 PROCEDURE — 700111 HCHG RX REV CODE 636 W/ 250 OVERRIDE (IP): Performed by: HOSPITALIST

## 2017-01-01 PROCEDURE — 160009 HCHG ANES TIME/MIN: Performed by: INTERNAL MEDICINE

## 2017-01-01 PROCEDURE — 99285 EMERGENCY DEPT VISIT HI MDM: CPT

## 2017-01-01 PROCEDURE — 700102 HCHG RX REV CODE 250 W/ 637 OVERRIDE(OP): Performed by: INTERNAL MEDICINE

## 2017-01-01 PROCEDURE — 73080 X-RAY EXAM OF ELBOW: CPT | Mod: TC,RT | Performed by: NURSE PRACTITIONER

## 2017-01-01 PROCEDURE — 160002 HCHG RECOVERY MINUTES (STAT): Performed by: INTERNAL MEDICINE

## 2017-01-01 PROCEDURE — 76775 US EXAM ABDO BACK WALL LIM: CPT

## 2017-01-01 PROCEDURE — 86706 HEP B SURFACE ANTIBODY: CPT

## 2017-01-01 PROCEDURE — 93005 ELECTROCARDIOGRAM TRACING: CPT

## 2017-01-01 PROCEDURE — 87040 BLOOD CULTURE FOR BACTERIA: CPT

## 2017-01-01 PROCEDURE — 93005 ELECTROCARDIOGRAM TRACING: CPT | Performed by: EMERGENCY MEDICINE

## 2017-01-01 PROCEDURE — 82140 ASSAY OF AMMONIA: CPT

## 2017-01-01 PROCEDURE — 87340 HEPATITIS B SURFACE AG IA: CPT

## 2017-01-01 PROCEDURE — 83516 IMMUNOASSAY NONANTIBODY: CPT | Mod: 91

## 2017-01-01 PROCEDURE — 700105 HCHG RX REV CODE 258: Performed by: HOSPITALIST

## 2017-01-01 PROCEDURE — 160208 HCHG ENDO MINUTES - EA ADDL 1 MIN LEVEL 4: Performed by: INTERNAL MEDICINE

## 2017-01-01 PROCEDURE — 96367 TX/PROPH/DG ADDL SEQ IV INF: CPT

## 2017-01-01 PROCEDURE — 96365 THER/PROPH/DIAG IV INF INIT: CPT

## 2017-01-01 PROCEDURE — 87086 URINE CULTURE/COLONY COUNT: CPT

## 2017-01-01 PROCEDURE — 86038 ANTINUCLEAR ANTIBODIES: CPT

## 2017-01-01 PROCEDURE — 83690 ASSAY OF LIPASE: CPT

## 2017-01-01 PROCEDURE — 83735 ASSAY OF MAGNESIUM: CPT

## 2017-01-01 PROCEDURE — 99239 HOSP IP/OBS DSCHRG MGMT >30: CPT | Performed by: HOSPITALIST

## 2017-01-01 PROCEDURE — 93306 TTE W/DOPPLER COMPLETE: CPT

## 2017-01-01 PROCEDURE — 160203 HCHG ENDO MINUTES - 1ST 30 MINS LEVEL 4: Performed by: INTERNAL MEDICINE

## 2017-01-01 PROCEDURE — 36556 INSERT NON-TUNNEL CV CATH: CPT

## 2017-01-01 PROCEDURE — 86803 HEPATITIS C AB TEST: CPT

## 2017-01-01 PROCEDURE — 93306 TTE W/DOPPLER COMPLETE: CPT | Mod: 26 | Performed by: INTERNAL MEDICINE

## 2017-01-01 PROCEDURE — 83605 ASSAY OF LACTIC ACID: CPT

## 2017-01-01 PROCEDURE — 85046 RETICYTE/HGB CONCENTRATE: CPT

## 2017-01-01 PROCEDURE — 88342 IMHCHEM/IMCYTCHM 1ST ANTB: CPT

## 2017-01-01 PROCEDURE — 0DBP8ZX EXCISION OF RECTUM, VIA NATURAL OR ARTIFICIAL OPENING ENDOSCOPIC, DIAGNOSTIC: ICD-10-PCS | Performed by: INTERNAL MEDICINE

## 2017-01-01 PROCEDURE — 36415 COLL VENOUS BLD VENIPUNCTURE: CPT | Mod: GA

## 2017-01-01 PROCEDURE — 85610 PROTHROMBIN TIME: CPT

## 2017-01-01 PROCEDURE — 51702 INSERT TEMP BLADDER CATH: CPT

## 2017-01-01 PROCEDURE — 80061 LIPID PANEL: CPT

## 2017-01-01 PROCEDURE — 99214 OFFICE O/P EST MOD 30 MIN: CPT | Performed by: PHYSICIAN ASSISTANT

## 2017-01-01 PROCEDURE — 99291 CRITICAL CARE FIRST HOUR: CPT

## 2017-01-01 PROCEDURE — 83605 ASSAY OF LACTIC ACID: CPT | Mod: 91

## 2017-01-01 PROCEDURE — 0DBF8ZX EXCISION OF RIGHT LARGE INTESTINE, VIA NATURAL OR ARTIFICIAL OPENING ENDOSCOPIC, DIAGNOSTIC: ICD-10-PCS | Performed by: INTERNAL MEDICINE

## 2017-01-01 PROCEDURE — 51798 US URINE CAPACITY MEASURE: CPT

## 2017-01-01 PROCEDURE — 770022 HCHG ROOM/CARE - ICU (200)

## 2017-01-01 PROCEDURE — 83540 ASSAY OF IRON: CPT

## 2017-01-01 PROCEDURE — 501629 HCHG TUBE, LUKI TRAP STERILE DISP: Performed by: INTERNAL MEDICINE

## 2017-01-01 PROCEDURE — 99024 POSTOP FOLLOW-UP VISIT: CPT | Performed by: PHYSICIAN ASSISTANT

## 2017-01-01 PROCEDURE — 93970 EXTREMITY STUDY: CPT

## 2017-01-01 PROCEDURE — 303105 HCHG CATHETER EXTRA

## 2017-01-01 PROCEDURE — 82533 TOTAL CORTISOL: CPT

## 2017-01-01 PROCEDURE — 700111 HCHG RX REV CODE 636 W/ 250 OVERRIDE (IP): Performed by: PHARMACIST

## 2017-01-01 PROCEDURE — 71275 CT ANGIOGRAPHY CHEST: CPT

## 2017-01-01 PROCEDURE — 12001 RPR S/N/AX/GEN/TRNK 2.5CM/<: CPT | Performed by: NURSE PRACTITIONER

## 2017-01-01 PROCEDURE — 81001 URINALYSIS AUTO W/SCOPE: CPT

## 2017-01-01 PROCEDURE — 99223 1ST HOSP IP/OBS HIGH 75: CPT | Mod: AI | Performed by: INTERNAL MEDICINE

## 2017-01-01 PROCEDURE — 83880 ASSAY OF NATRIURETIC PEPTIDE: CPT | Mod: GA

## 2017-01-01 RX ORDER — CEPHALEXIN 250 MG/1
500 CAPSULE ORAL 3 TIMES DAILY
Status: DISCONTINUED | OUTPATIENT
Start: 2017-01-01 | End: 2017-01-01

## 2017-01-01 RX ORDER — SODIUM CHLORIDE 9 MG/ML
30 INJECTION, SOLUTION INTRAVENOUS
Status: DISCONTINUED | OUTPATIENT
Start: 2017-01-01 | End: 2017-01-01

## 2017-01-01 RX ORDER — DIPHENHYDRAMINE HYDROCHLORIDE 50 MG/ML
25 INJECTION INTRAMUSCULAR; INTRAVENOUS ONCE
Status: COMPLETED | OUTPATIENT
Start: 2017-01-01 | End: 2017-01-01

## 2017-01-01 RX ORDER — HEPARIN SODIUM 1000 [USP'U]/ML
3200 INJECTION, SOLUTION INTRAVENOUS; SUBCUTANEOUS PRN
Status: DISCONTINUED | OUTPATIENT
Start: 2017-01-01 | End: 2017-01-01

## 2017-01-01 RX ORDER — FUROSEMIDE 40 MG/1
40 TABLET ORAL DAILY
COMMUNITY

## 2017-01-01 RX ORDER — SODIUM CHLORIDE 9 MG/ML
INJECTION, SOLUTION INTRAVENOUS CONTINUOUS
Status: DISCONTINUED | OUTPATIENT
Start: 2017-01-01 | End: 2017-01-01 | Stop reason: ALTCHOICE

## 2017-01-01 RX ORDER — MORPHINE SULFATE 4 MG/ML
2 INJECTION, SOLUTION INTRAMUSCULAR; INTRAVENOUS EVERY 4 HOURS PRN
Status: DISCONTINUED | OUTPATIENT
Start: 2017-01-01 | End: 2017-01-01 | Stop reason: HOSPADM

## 2017-01-01 RX ORDER — FUROSEMIDE 20 MG/1
20 TABLET ORAL DAILY
Qty: 30 TAB | Refills: 0 | Status: ON HOLD | OUTPATIENT
Start: 2017-01-01 | End: 2017-01-01

## 2017-01-01 RX ORDER — SODIUM CHLORIDE 9 MG/ML
1000 INJECTION, SOLUTION INTRAVENOUS ONCE
Status: COMPLETED | OUTPATIENT
Start: 2017-01-01 | End: 2017-01-01

## 2017-01-01 RX ORDER — DIPHENHYDRAMINE HCL 25 MG
25 TABLET ORAL ONCE
Status: COMPLETED | OUTPATIENT
Start: 2017-01-01 | End: 2017-01-01

## 2017-01-01 RX ORDER — POLYETHYLENE GLYCOL 3350 17 G/17G
1 POWDER, FOR SOLUTION ORAL
Status: DISCONTINUED | OUTPATIENT
Start: 2017-01-01 | End: 2017-01-01 | Stop reason: HOSPADM

## 2017-01-01 RX ORDER — PEG-3350, SODIUM SULFATE, SODIUM CHLORIDE, POTASSIUM CHLORIDE, SODIUM ASCORBATE AND ASCORBIC ACID 7.5-2.691G
100 KIT ORAL 2 TIMES DAILY
Status: COMPLETED | OUTPATIENT
Start: 2017-01-01 | End: 2017-01-01

## 2017-01-01 RX ORDER — BISACODYL 10 MG
10 SUPPOSITORY, RECTAL RECTAL
Status: DISCONTINUED | OUTPATIENT
Start: 2017-01-01 | End: 2017-01-01

## 2017-01-01 RX ORDER — POLYETHYLENE GLYCOL 3350 17 G/17G
1 POWDER, FOR SOLUTION ORAL
Status: DISCONTINUED | OUTPATIENT
Start: 2017-01-01 | End: 2017-01-01

## 2017-01-01 RX ORDER — ONDANSETRON 4 MG/1
4 TABLET, ORALLY DISINTEGRATING ORAL EVERY 4 HOURS PRN
Status: DISCONTINUED | OUTPATIENT
Start: 2017-01-01 | End: 2017-01-01 | Stop reason: HOSPADM

## 2017-01-01 RX ORDER — ACETAMINOPHEN 325 MG/1
650 TABLET ORAL EVERY 6 HOURS PRN
Status: DISCONTINUED | OUTPATIENT
Start: 2017-01-01 | End: 2017-01-01

## 2017-01-01 RX ORDER — VITS A,C,E/LUTEIN/MINERALS 300MCG-200
1 TABLET ORAL DAILY
Status: DISCONTINUED | OUTPATIENT
Start: 2017-01-01 | End: 2017-01-01 | Stop reason: HOSPADM

## 2017-01-01 RX ORDER — ONDANSETRON 2 MG/ML
4 INJECTION INTRAMUSCULAR; INTRAVENOUS EVERY 4 HOURS PRN
Status: DISCONTINUED | OUTPATIENT
Start: 2017-01-01 | End: 2017-01-01 | Stop reason: HOSPADM

## 2017-01-01 RX ORDER — ACETAMINOPHEN 325 MG/1
650 TABLET ORAL EVERY 6 HOURS PRN
Status: DISCONTINUED | OUTPATIENT
Start: 2017-01-01 | End: 2017-01-01 | Stop reason: HOSPADM

## 2017-01-01 RX ORDER — AMOXICILLIN 250 MG
2 CAPSULE ORAL 2 TIMES DAILY
Status: DISCONTINUED | OUTPATIENT
Start: 2017-01-01 | End: 2017-01-01 | Stop reason: HOSPADM

## 2017-01-01 RX ORDER — DEXTROSE MONOHYDRATE 50 MG/ML
INJECTION, SOLUTION INTRAVENOUS CONTINUOUS
Status: DISCONTINUED | OUTPATIENT
Start: 2017-01-01 | End: 2017-01-01

## 2017-01-01 RX ORDER — HYDROCHLOROTHIAZIDE 25 MG/1
25 TABLET ORAL DAILY
Qty: 3 TAB | Refills: 0 | Status: SHIPPED | OUTPATIENT
Start: 2017-01-01 | End: 2017-01-01

## 2017-01-01 RX ORDER — AMOXICILLIN 250 MG
2 CAPSULE ORAL 2 TIMES DAILY
Status: DISCONTINUED | OUTPATIENT
Start: 2017-01-01 | End: 2017-01-01

## 2017-01-01 RX ORDER — ACETAMINOPHEN 325 MG/1
650 TABLET ORAL ONCE
Status: COMPLETED | OUTPATIENT
Start: 2017-01-01 | End: 2017-01-01

## 2017-01-01 RX ORDER — OXYCODONE HYDROCHLORIDE 5 MG/1
5 TABLET ORAL EVERY 4 HOURS PRN
Status: DISCONTINUED | OUTPATIENT
Start: 2017-01-01 | End: 2017-01-01 | Stop reason: HOSPADM

## 2017-01-01 RX ORDER — LANOLIN ALCOHOL/MO/W.PET/CERES
325 CREAM (GRAM) TOPICAL 2 TIMES DAILY WITH MEALS
Qty: 60 TAB | Refills: 2 | Status: SHIPPED | OUTPATIENT
Start: 2017-01-01 | End: 2017-01-01

## 2017-01-01 RX ORDER — SODIUM CHLORIDE 9 MG/ML
30 INJECTION, SOLUTION INTRAVENOUS ONCE
Status: COMPLETED | OUTPATIENT
Start: 2017-01-01 | End: 2017-01-01

## 2017-01-01 RX ORDER — SODIUM CHLORIDE 9 MG/ML
INJECTION, SOLUTION INTRAVENOUS
Status: DISCONTINUED
Start: 2017-01-01 | End: 2017-01-01

## 2017-01-01 RX ORDER — ATROPINE SULFATE 10 MG/ML
2 SOLUTION/ DROPS OPHTHALMIC EVERY 4 HOURS PRN
Status: DISCONTINUED | OUTPATIENT
Start: 2017-01-01 | End: 2017-01-01 | Stop reason: HOSPADM

## 2017-01-01 RX ORDER — BISACODYL 10 MG
10 SUPPOSITORY, RECTAL RECTAL
Status: DISCONTINUED | OUTPATIENT
Start: 2017-01-01 | End: 2017-01-01 | Stop reason: HOSPADM

## 2017-01-01 RX ORDER — FUROSEMIDE 40 MG/1
40 TABLET ORAL DAILY
Status: DISCONTINUED | OUTPATIENT
Start: 2017-01-01 | End: 2017-01-01 | Stop reason: HOSPADM

## 2017-01-01 RX ORDER — CHLORAL HYDRATE 500 MG
1000 CAPSULE ORAL
Status: ON HOLD | COMMUNITY
End: 2017-01-01

## 2017-01-01 RX ORDER — SODIUM CHLORIDE 9 MG/ML
500 INJECTION, SOLUTION INTRAVENOUS
Status: COMPLETED | OUTPATIENT
Start: 2017-01-01 | End: 2017-01-01

## 2017-01-01 RX ORDER — TRAMADOL HYDROCHLORIDE 50 MG/1
50 TABLET ORAL EVERY 8 HOURS PRN
Qty: 30 TAB | Refills: 0 | Status: SHIPPED | OUTPATIENT
Start: 2017-01-01

## 2017-01-01 RX ORDER — DEXTROSE AND SODIUM CHLORIDE 5; .9 G/100ML; G/100ML
INJECTION, SOLUTION INTRAVENOUS CONTINUOUS
Status: DISCONTINUED | OUTPATIENT
Start: 2017-01-01 | End: 2017-01-01

## 2017-01-01 RX ADMIN — ENOXAPARIN SODIUM 100 MG: 100 INJECTION SUBCUTANEOUS at 20:15

## 2017-01-01 RX ADMIN — OXYCODONE HYDROCHLORIDE 5 MG: 5 TABLET ORAL at 10:09

## 2017-01-01 RX ADMIN — OXYCODONE HYDROCHLORIDE 5 MG: 5 TABLET ORAL at 21:39

## 2017-01-01 RX ADMIN — HYDROCORTISONE SODIUM SUCCINATE 100 MG: 100 INJECTION, POWDER, FOR SOLUTION INTRAMUSCULAR; INTRAVENOUS at 02:20

## 2017-01-01 RX ADMIN — DIPHENHYDRAMINE HCL 25 MG: 25 TABLET ORAL at 14:19

## 2017-01-01 RX ADMIN — ENOXAPARIN SODIUM 100 MG: 100 INJECTION SUBCUTANEOUS at 08:55

## 2017-01-01 RX ADMIN — DOCUSATE SODIUM AND SENNOSIDES 2 TABLET: 8.6; 5 TABLET, FILM COATED ORAL at 08:00

## 2017-01-01 RX ADMIN — ENOXAPARIN SODIUM 100 MG: 100 INJECTION SUBCUTANEOUS at 09:58

## 2017-01-01 RX ADMIN — ENOXAPARIN SODIUM 100 MG: 100 INJECTION SUBCUTANEOUS at 00:26

## 2017-01-01 RX ADMIN — I-VITE, TAB 1000-60-2MG (60/BT) 1 TABLET: TAB at 08:33

## 2017-01-01 RX ADMIN — OXYCODONE HYDROCHLORIDE 5 MG: 5 TABLET ORAL at 20:47

## 2017-01-01 RX ADMIN — FUROSEMIDE 40 MG: 40 TABLET ORAL at 09:58

## 2017-01-01 RX ADMIN — POLYETHYLENE GLYCOL 3350, SODIUM SULFATE, SODIUM CHLORIDE, POTASSIUM CHLORIDE, ASCORBIC ACID, SODIUM ASCORBATE 100 G: KIT at 12:45

## 2017-01-01 RX ADMIN — PIPERACILLIN AND TAZOBACTAM 3.38 G: 3; .375 INJECTION, POWDER, FOR SOLUTION INTRAVENOUS at 12:07

## 2017-01-01 RX ADMIN — IRON DEXTRAN 25 MG: 50 INJECTION INTRAMUSCULAR; INTRAVENOUS at 14:20

## 2017-01-01 RX ADMIN — CEPHALEXIN 500 MG: 250 CAPSULE ORAL at 09:58

## 2017-01-01 RX ADMIN — DOCUSATE SODIUM AND SENNOSIDES 2 TABLET: 8.6; 5 TABLET, FILM COATED ORAL at 09:58

## 2017-01-01 RX ADMIN — DEXTROSE MONOHYDRATE: 50 INJECTION, SOLUTION INTRAVENOUS at 05:31

## 2017-01-01 RX ADMIN — PIPERACILLIN AND TAZOBACTAM 3.38 G: 3; .375 INJECTION, POWDER, FOR SOLUTION INTRAVENOUS at 00:44

## 2017-01-01 RX ADMIN — IRON DEXTRAN 1675 MG: 50 INJECTION INTRAMUSCULAR; INTRAVENOUS at 17:00

## 2017-01-01 RX ADMIN — CALCIUM GLUCONATE 1 G: 94 INJECTION, SOLUTION INTRAVENOUS at 04:14

## 2017-01-01 RX ADMIN — CEPHALEXIN 500 MG: 250 CAPSULE ORAL at 15:20

## 2017-01-01 RX ADMIN — SODIUM CHLORIDE 500 ML: 9 INJECTION, SOLUTION INTRAVENOUS at 00:09

## 2017-01-01 RX ADMIN — ACETAMINOPHEN 650 MG: 325 TABLET, FILM COATED ORAL at 14:20

## 2017-01-01 RX ADMIN — MORPHINE SULFATE 2 MG: 4 INJECTION INTRAVENOUS at 20:14

## 2017-01-01 RX ADMIN — SODIUM CHLORIDE 1000 ML: 9 INJECTION, SOLUTION INTRAVENOUS at 16:31

## 2017-01-01 RX ADMIN — OXYCODONE HYDROCHLORIDE 5 MG: 5 TABLET ORAL at 16:11

## 2017-01-01 RX ADMIN — STANDARDIZED SENNA CONCENTRATE AND DOCUSATE SODIUM 2 TABLET: 8.6; 5 TABLET, FILM COATED ORAL at 20:34

## 2017-01-01 RX ADMIN — CEPHALEXIN 500 MG: 250 CAPSULE ORAL at 20:14

## 2017-01-01 RX ADMIN — DOCUSATE SODIUM AND SENNOSIDES 2 TABLET: 8.6; 5 TABLET, FILM COATED ORAL at 20:14

## 2017-01-01 RX ADMIN — PIPERACILLIN AND TAZOBACTAM 3.38 G: 3; .375 INJECTION, POWDER, FOR SOLUTION INTRAVENOUS at 19:30

## 2017-01-01 RX ADMIN — NOREPINEPHRINE BITARTRATE 28 MCG/MIN: 1 INJECTION INTRAVENOUS at 04:14

## 2017-01-01 RX ADMIN — PIPERACILLIN AND TAZOBACTAM 3.38 G: 3; .375 INJECTION, POWDER, FOR SOLUTION INTRAVENOUS at 00:08

## 2017-01-01 RX ADMIN — PIPERACILLIN AND TAZOBACTAM 3.38 G: 3; .375 INJECTION, POWDER, FOR SOLUTION INTRAVENOUS at 12:46

## 2017-01-01 RX ADMIN — OXYCODONE HYDROCHLORIDE 5 MG: 5 TABLET ORAL at 05:37

## 2017-01-01 RX ADMIN — OXYCODONE HYDROCHLORIDE 5 MG: 5 TABLET ORAL at 06:15

## 2017-01-01 RX ADMIN — Medication 10 MG/HR: at 12:13

## 2017-01-01 RX ADMIN — ENOXAPARIN SODIUM 100 MG: 100 INJECTION SUBCUTANEOUS at 15:41

## 2017-01-01 RX ADMIN — PIPERACILLIN AND TAZOBACTAM 3.38 G: 3; .375 INJECTION, POWDER, FOR SOLUTION INTRAVENOUS at 06:12

## 2017-01-01 RX ADMIN — I-VITE, TAB 1000-60-2MG (60/BT) 1 TABLET: TAB at 08:55

## 2017-01-01 RX ADMIN — FUROSEMIDE 40 MG: 40 TABLET ORAL at 08:55

## 2017-01-01 RX ADMIN — I-VITE, TAB 1000-60-2MG (60/BT) 1 TABLET: TAB at 08:01

## 2017-01-01 RX ADMIN — MORPHINE SULFATE 2 MG: 4 INJECTION INTRAVENOUS at 01:36

## 2017-01-01 RX ADMIN — FUROSEMIDE 40 MG: 40 TABLET ORAL at 08:33

## 2017-01-01 RX ADMIN — ENOXAPARIN SODIUM 100 MG: 100 INJECTION SUBCUTANEOUS at 09:51

## 2017-01-01 RX ADMIN — SODIUM CHLORIDE: 9 INJECTION, SOLUTION INTRAVENOUS at 00:08

## 2017-01-01 RX ADMIN — POLYETHYLENE GLYCOL 3350, SODIUM SULFATE, SODIUM CHLORIDE, POTASSIUM CHLORIDE, ASCORBIC ACID, SODIUM ASCORBATE 100 G: KIT at 21:40

## 2017-01-01 RX ADMIN — MORPHINE SULFATE 2 MG: 4 INJECTION INTRAVENOUS at 14:45

## 2017-01-01 RX ADMIN — I-VITE, TAB 1000-60-2MG (60/BT) 1 TABLET: TAB at 09:58

## 2017-01-01 RX ADMIN — ENOXAPARIN SODIUM 100 MG: 100 INJECTION SUBCUTANEOUS at 20:49

## 2017-01-01 RX ADMIN — SODIUM CHLORIDE 2967 ML: 9 INJECTION, SOLUTION INTRAVENOUS at 11:18

## 2017-01-01 RX ADMIN — ONDANSETRON 4 MG: 2 INJECTION INTRAMUSCULAR; INTRAVENOUS at 08:20

## 2017-01-01 RX ADMIN — NOREPINEPHRINE BITARTRATE 10 MCG/MIN: 1 INJECTION INTRAVENOUS at 12:27

## 2017-01-01 RX ADMIN — FUROSEMIDE 40 MG: 40 TABLET ORAL at 08:01

## 2017-01-01 RX ADMIN — PIPERACILLIN AND TAZOBACTAM 3.38 G: 3; .375 INJECTION, POWDER, FOR SOLUTION INTRAVENOUS at 05:37

## 2017-01-01 RX ADMIN — SODIUM CHLORIDE 1000 ML: 9 INJECTION, SOLUTION INTRAVENOUS at 21:13

## 2017-01-01 RX ADMIN — TAZOBACTAM SODIUM AND PIPERACILLIN SODIUM 4.5 G: 500; 4 INJECTION, SOLUTION INTRAVENOUS at 05:31

## 2017-01-01 RX ADMIN — PIPERACILLIN AND TAZOBACTAM 3.38 G: 3; .375 INJECTION, POWDER, FOR SOLUTION INTRAVENOUS at 21:49

## 2017-01-01 RX ADMIN — NOREPINEPHRINE BITARTRATE 15 MCG/MIN: 1 INJECTION INTRAVENOUS at 21:35

## 2017-01-01 RX ADMIN — SODIUM CHLORIDE 1000 ML: 9 INJECTION, SOLUTION INTRAVENOUS at 19:22

## 2017-01-01 RX ADMIN — DOCUSATE SODIUM AND SENNOSIDES 2 TABLET: 8.6; 5 TABLET, FILM COATED ORAL at 00:26

## 2017-01-01 RX ADMIN — CEPHALEXIN 500 MG: 250 CAPSULE ORAL at 00:26

## 2017-01-01 RX ADMIN — VANCOMYCIN HYDROCHLORIDE 2500 MG: 100 INJECTION, POWDER, LYOPHILIZED, FOR SOLUTION INTRAVENOUS at 13:38

## 2017-01-01 RX ADMIN — PIPERACILLIN AND TAZOBACTAM 3.38 G: 3; .375 INJECTION, POWDER, FOR SOLUTION INTRAVENOUS at 15:10

## 2017-01-01 RX ADMIN — VASOPRESSIN 0.03 UNITS/MIN: 20 INJECTION INTRAVENOUS at 00:38

## 2017-01-01 RX ADMIN — OXYCODONE HYDROCHLORIDE 5 MG: 5 TABLET ORAL at 00:32

## 2017-01-01 RX ADMIN — DOCUSATE SODIUM AND SENNOSIDES 2 TABLET: 8.6; 5 TABLET, FILM COATED ORAL at 08:55

## 2017-01-01 RX ADMIN — DOCUSATE SODIUM AND SENNOSIDES 2 TABLET: 8.6; 5 TABLET, FILM COATED ORAL at 20:47

## 2017-01-01 RX ADMIN — PIPERACILLIN SODIUM AND TAZOBACTAM SODIUM 4.5 G: 4; .5 INJECTION, POWDER, FOR SOLUTION INTRAVENOUS at 11:26

## 2017-01-01 RX ADMIN — TAZOBACTAM SODIUM AND PIPERACILLIN SODIUM 4.5 G: 500; 4 INJECTION, SOLUTION INTRAVENOUS at 23:55

## 2017-01-01 RX ADMIN — SODIUM CHLORIDE: 9 INJECTION, SOLUTION INTRAVENOUS at 14:29

## 2017-01-01 RX ADMIN — TAZOBACTAM SODIUM AND PIPERACILLIN SODIUM 4.5 G: 500; 4 INJECTION, SOLUTION INTRAVENOUS at 17:42

## 2017-01-01 RX ADMIN — CEPHALEXIN 500 MG: 250 CAPSULE ORAL at 08:01

## 2017-01-01 ASSESSMENT — ENCOUNTER SYMPTOMS
JOINT SWELLING: 0
CHILLS: 0
COUGH: 0
CLAUDICATION: 0
SHORTNESS OF BREATH: 1
FALLS: 1
HEADACHES: 0
TINGLING: 0
SHORTNESS OF BREATH: 1
COUGH: 0
DEPRESSION: 0
SPUTUM PRODUCTION: 0
VOMITING: 0
CONSTIPATION: 0
VOMITING: 0
DIARRHEA: 0
CONSTIPATION: 0
COUGH: 0
WHEEZING: 0
NERVOUS/ANXIOUS: 1
CHILLS: 1
DEPRESSION: 0
DIARRHEA: 0
NAUSEA: 0
MYALGIAS: 0
ANOREXIA: 0
SORE THROAT: 0
DIZZINESS: 0
VOMITING: 0
FEVER: 1
ABDOMINAL PAIN: 1
NAUSEA: 0
SHORTNESS OF BREATH: 1
FEVER: 0
DIARRHEA: 0
SHORTNESS OF BREATH: 1
CHILLS: 0
FEVER: 0
DIZZINESS: 0
FOCAL WEAKNESS: 0
SHORTNESS OF BREATH: 1
HEADACHES: 0
DIARRHEA: 0
PALPITATIONS: 0
WHEEZING: 0
EDEMA: 1
NAUSEA: 0
FOCAL WEAKNESS: 0
DEPRESSION: 0
HEADACHES: 0
MYALGIAS: 0
VOMITING: 0
DIAPHORESIS: 0
FEVER: 0
PHOTOPHOBIA: 0
HEADACHES: 0
COUGH: 0
CHILLS: 0
WHEEZING: 0
FEVER: 0
WEAKNESS: 0
VOMITING: 0
NAUSEA: 0
ABDOMINAL PAIN: 0
WHEEZING: 0
NAUSEA: 0
CONSTIPATION: 0
WEAKNESS: 1
NERVOUS/ANXIOUS: 1
ARTHRALGIAS: 1
COUGH: 0
WHEEZING: 0

## 2017-01-01 ASSESSMENT — LIFESTYLE VARIABLES
ALCOHOL_USE: NO
EVER_SMOKED: NEVER
EVER_SMOKED: YES
EVER_SMOKED: NEVER
EVER_SMOKED: NEVER
DO YOU DRINK ALCOHOL: NO
EVER_SMOKED: NEVER

## 2017-01-01 ASSESSMENT — PAIN SCALES - GENERAL
PAINLEVEL_OUTOF10: 9
PAINLEVEL_OUTOF10: 0
PAINLEVEL_OUTOF10: 2
PAINLEVEL_OUTOF10: 9
PAINLEVEL: 9=SEVERE PAIN
PAINLEVEL_OUTOF10: 0
PAINLEVEL: 10=SEVERE PAIN
PAINLEVEL_OUTOF10: 0
PAINLEVEL_OUTOF10: 5
PAINLEVEL_OUTOF10: 3
PAINLEVEL_OUTOF10: 0
PAINLEVEL_OUTOF10: 9
PAINLEVEL_OUTOF10: 7
PAINLEVEL_OUTOF10: 2
PAINLEVEL_OUTOF10: 9
PAINLEVEL_OUTOF10: 3
PAINLEVEL_OUTOF10: 6
PAINLEVEL_OUTOF10: 0

## 2017-01-01 ASSESSMENT — COPD QUESTIONNAIRES
COPD SCREENING SCORE: 5
DURING THE PAST 4 WEEKS HOW MUCH DID YOU FEEL SHORT OF BREATH: SOME OF THE TIME
HAVE YOU SMOKED AT LEAST 100 CIGARETTES IN YOUR ENTIRE LIFE: YES
HAVE YOU SMOKED AT LEAST 100 CIGARETTES IN YOUR ENTIRE LIFE: YES
DO YOU EVER COUGH UP ANY MUCUS OR PHLEGM?: NO/ONLY WITH OCCASIONAL COLDS OR INFECTIONS
DO YOU EVER COUGH UP ANY MUCUS OR PHLEGM?: NO/ONLY WITH OCCASIONAL COLDS OR INFECTIONS
DURING THE PAST 4 WEEKS HOW MUCH DID YOU FEEL SHORT OF BREATH: SOME OF THE TIME
COPD SCREENING SCORE: 5

## 2017-01-01 ASSESSMENT — PATIENT HEALTH QUESTIONNAIRE - PHQ9
SUM OF ALL RESPONSES TO PHQ9 QUESTIONS 1 AND 2: 0
1. LITTLE INTEREST OR PLEASURE IN DOING THINGS: NOT AT ALL
SUM OF ALL RESPONSES TO PHQ QUESTIONS 1-9: 0
2. FEELING DOWN, DEPRESSED, IRRITABLE, OR HOPELESS: NOT AT ALL

## 2017-01-01 ASSESSMENT — ACTIVITIES OF DAILY LIVING (ADL): TOILETING: INDEPENDENT

## 2017-09-26 NOTE — PROGRESS NOTES
"Subjective:      Denia Landry is a 67 y.o. female who presents with Edema (Bilateral lower extremity swelling x 1 wk. Negative US for DVT's at [facility])            Patient presents with a 6 day history of lower leg swelling of both legs.  Symptoms slightly better today than yesterday.  History of intermittent edema in the past with previous treatment with a \"water pill\", patient is requesting this medication today.  She was evaluated yesterday at [facility] for various joint pain and was sent for an US of the LE at [facility] to rule out a DVT.  Per the patient the test was negative for DVT.  She denies chest pain, does admit to mild shortness of breath.  Denies recent increase/decrease in activity.        Edema   This is a new problem. Episode onset: 6 days ago. The problem occurs constantly. The problem has been gradually improving. Associated symptoms include arthralgias. Pertinent negatives include no abdominal pain, anorexia, chest pain, congestion, coughing, diaphoresis, fever, joint swelling, myalgias, nausea, vomiting or weakness. Nothing aggravates the symptoms. She has tried immobilization (elevation) for the symptoms. The treatment provided mild relief.       Review of Systems   Constitutional: Negative for diaphoresis, fever and malaise/fatigue.   HENT: Negative for congestion.    Respiratory: Positive for shortness of breath. Negative for cough, sputum production and wheezing.    Cardiovascular: Positive for leg swelling. Negative for chest pain and claudication.   Gastrointestinal: Negative for abdominal pain, anorexia, nausea and vomiting.   Musculoskeletal: Positive for arthralgias. Negative for joint swelling and myalgias.   Neurological: Negative for focal weakness and weakness.          Objective:     /64   Pulse (!) 101   Temp 36.3 °C (97.3 °F)   Ht 1.727 m (5' 8\")   Wt 108 kg (238 lb)   SpO2 97%   BMI 36.19 kg/m²      Physical Exam   Constitutional: She is oriented to person, " "place, and time. She appears well-developed and well-nourished.   Cardiovascular: Normal rate, regular rhythm, normal heart sounds and intact distal pulses.  Exam reveals no gallop and no friction rub.    No murmur heard.  Rate is normal at time of exam 86bpm   Pulmonary/Chest: Effort normal and breath sounds normal. No respiratory distress. She has no wheezes. She has no rales.   Musculoskeletal: Normal range of motion.   Bilateral 2+ pitting edema of the LE from knees distally;  No focal calf tenderness with palpation   Neurological: She is alert and oriented to person, place, and time.   Skin: Skin is warm and dry.   Skin is intact, slightly brawny on medial aspects of lower legs;  No erythema/warmth or skin lesions.   Nursing note and vitals reviewed.         I contacted [facility], US of the left LE only completed yesterday and negative for DVT.       Assessment/Plan:     1. Edema, unspecified type    - CMP (12)  - B TYPE NATRIURETIC  - hydrochlorothiazide (HYDRODIURIL) 25 MG Tab; Take 1 Tab by mouth every day for 3 days.  Dispense: 3 Tab; Refill: 0    US of the left LE negative for DVT, US not completed on right LE yesterday;  Low suspicion for DVT due to lack of pain other than \"tightness\" due to swelling, bilateral symptoms and improvement in symptoms with elevation.  I have discussed with the patient the concern for an underlying cause, she is requesting a \"water pill\", she seems unhappy that I am requesting additional tests prior to prescribing medication.      Lab results reviewed, will provide short course of diuretic, elevate feet as discussed.  Follow-up with PCP for further evaluation of abnormal lab results likely unrelated to current symptoms.  Follow-up if symptoms change, get worse or new symptoms develop.            "

## 2017-09-27 NOTE — TELEPHONE ENCOUNTER
Pt lm for me asking for call back. No details give. Lm for pt with my direct line should she need to call back.

## 2017-09-27 NOTE — PROGRESS NOTES
"Subjective:      Denia Landry is a 67 y.o. female who presents with Laceration (x today / Rt Elbow / RT  knee )            HPI New problem. 67 year old female with right elbow pain and laceration following a fall today. She reports moderate pain in the elbow. No swelling or bruising noted. She tripped over a curb and fell straight on concrete. No LOC. She has an abrasion to right knee as well.    ROS       Objective:     /60   Pulse 74   Temp 36.8 °C (98.3 °F)   Resp 12   Ht 1.727 m (5' 8\")   Wt 108 kg (238 lb)   SpO2 97%   BMI 36.19 kg/m²      Physical Exam   Constitutional: She is oriented to person, place, and time. She appears well-developed and well-nourished. No distress.   Cardiovascular: Normal rate, regular rhythm and normal heart sounds.    No murmur heard.  Pulmonary/Chest: Effort normal and breath sounds normal.   Neurological: She is alert and oriented to person, place, and time. She exhibits normal muscle tone. Coordination normal.   Skin: Skin is warm and dry.   2 cm laceration, v-shaped to right elbow . No swelling noted and bilateral equal .   Psychiatric: She has a normal mood and affect. Her behavior is normal.          Procedure: Laceration Repair  -Risks including bleeding, nerve damage, infection, and poor cosmetic outcome discussed at length. Benefits and alternatives discussed.   -Sterile technique throughout  -Local anesthesia with 2% lidocaine  -Closed with5 #  4-0 Nylon interrupted sutures with good wound approximation  -Polysporin and dressing placed  -Patient tolerated well           Assessment/Plan:     1. Elbow pain, right  DX-ELBOW-COMPLETE 3+ RIGHT   2. Open wound of right elbow, initial encounter     3. Fall, initial encounter     4. Contusion of elbow, right       Sutures placed.  X-ray negative for fracture.  Wound care instructions given to patient and she has v/u of these.  Follow up 7-10 days for removal of sutures.  "

## 2017-09-28 PROBLEM — H35.30 MACULAR DEGENERATION: Status: ACTIVE | Noted: 2017-01-01

## 2017-09-28 NOTE — PROGRESS NOTES
Subjective:   CC: Denia Landry is a 67 y.o. female here today for establishing careAnd Lower leg edema for 8 days.  No prior PCP. Pt states she generally feels healthy and Only sees eye doctor for macular degeneration and intraocular injections.      Leg swelling started about 8 days ago. Patient was evaluated at [facility] w Neg lower extremity US for DVT. She was evaluated at urgent care and was given 3 hydrochlorothiazide. She has had leg swelling on and off in the past and was treated with Water pills in the past. Currently denies dyspnea orthopnea or CP. He was evaluated at urgent care and has a y as possibilities been taking hydrochlorothiazide for 3 days without much improvement. labs were significant for elevated BPN, elevated alkaline phosphatase and AST, and low albumin. Denies any abdominal pain or change in bowel movements. Patient is not alcoholic drinker.        Current medicines (including changes today)  Current Outpatient Prescriptions   Medication Sig Dispense Refill   • furosemide (LASIX) 20 MG Tab Take 1 Tab by mouth every day. Until swelling decreases 30 Tab 0   • Omega-3 Fatty Acids (FISH OIL) 1000 MG Cap capsule Take 1,000 mg by mouth 3 times a day, with meals.     • hydrochlorothiazide (HYDRODIURIL) 25 MG Tab Take 1 Tab by mouth every day for 3 days. 3 Tab 0   • Multiple Vitamins-Minerals (CENTRUM SILVER PO) Take  by mouth every day.     • Multiple Vitamins-Minerals (VISION-EARNESTINE PRESERVE PO) Take  by mouth every day.     • hydrocodone-acetaminophen (NORCO) 5-325 MG Tab per tablet Take 1-2 Tabs by mouth as needed.     • cephALEXin (KEFLEX) 500 MG Cap Take 500 mg by mouth 4 times a day.       No current facility-administered medications for this visit.          Past medical, surgical, family, and social history are reviewed in Epic chart by me today.   Medications and allergies reviewed in Epic chart by me today.         ROS   No chest pain, no shortness of breath, no abdominal pain  As  "documented in history of present illness above     Objective:     Blood pressure 102/64, pulse 100, temperature 37.1 °C (98.8 °F), resp. rate 16, height 1.727 m (5' 8\"), weight 106.7 kg (235 lb 3.2 oz), SpO2 93 %, not currently breastfeeding. Body mass index is 35.76 kg/m².   Physical Exam:  Constitutional: Alert, oriented in no acute distress.  Psych: Eye contact is good, speech goal directed, affect calm  Eyes: blood spot over sclera from yesterday eye injection, sclera non-icteric.  Lungs:  clear to auscultation bilaterally, w/o rales, no wheez or rhonci  CV: regular rate and rhythm. Pos pitting lower extremity edema  Abdomen: soft, nontender,             Assessment and Plan:   The following treatment plan was discussed    1. Lower leg edema  Patient's potassium level was within normal limits. I will have her start on Lasix.  Not sure what is causing her lower leg edema at this point. She did have transaminitis with low albumin and elevated alkaline phosphatase therefore I will be evaluate w liver ultrasound as well as cardiac echo      - TSH WITH REFLEX TO FT4; Future  - ECHOCARDIOGRAM COMP W/O CONT; Future  - furosemide (LASIX) 20 MG Tab; Take 1 Tab by mouth every day. Until swelling decreases  Dispense: 30 Tab; Refill: 0    2. Elevated alkaline phosphatase level    - REFERRAL TO GASTROENTEROLOGY  - US-LIVER AND BILIARY TREE; Future    3. Transaminitis    - REFERRAL TO GASTROENTEROLOGY  - LIPID PROFILE; Future  - US-LIVER AND BILIARY TREE; Future    4. Screening for malignant neoplasm of colon  - REFERRAL TO GI FOR COLONOSCOPY    5. Osteopathy    - DS-BONE DENSITY STUDY (DEXA); Future    6. Encounter for screening mammogram for malignant neoplasm of breast    - MA-SCREEN MAMMO W/CAD-BILAT; Future    7. Prediabetes    - POCT  A1C     We discussed red flags incuding worsening pain, pressure, shortness of breath or overall decline in health. Patient verbalize understanding and will either call our office or " present to the emergency room.     Followup: Return in about 2 weeks (around 10/12/2017).         Please note that this dictation was created using voice recognition software. I have made every reasonable attempt to correct obvious errors, but I expect that there are errors of grammar and possibly content that I did not discover before finalizing the note.

## 2017-10-06 NOTE — TELEPHONE ENCOUNTER
Phone Number Called: [phone] (home)     Message: Pt notified of results below.     Left Message for patient to call back: no

## 2017-10-06 NOTE — TELEPHONE ENCOUNTER
Please inform patient that he liver ultrasound showed some abnormality including possible liver cirrhosis. She definitely needs to follow-up with gastroenterology to discuss results and for further evaluation.  Further evaluation is recommended w  MRI or abdominal CT scan, I think it is better w GI takes over and order it.   Does she have an upcoming appointment with gastroenterology?  If not please contact our referral Center and follow-up on her referral.    Patient also has elevated cholesterol and needs an appointment to discuss this.     Manju Diaz P.A.-C.

## 2017-10-10 NOTE — TELEPHONE ENCOUNTER
----- Message from Manju Diaz P.A.-C. sent at 10/10/2017 10:05 AM PDT -----  Please inform patient that cardiac Echo looked ok.     Manju Diaz P.A.-C.

## 2017-10-10 NOTE — TELEPHONE ENCOUNTER
Phone Number Called: [phone] (home)     Message: Pt notified of results below.     Left Message for patient to call back: no

## 2017-10-12 NOTE — PROGRESS NOTES
"No change in med hx, surg hx, allergy hx, or current meds since previous visit    HPI: suture removal.   She feels it has been healing well.  No increased pain, redness or swelling.  No drainage form the wound.     ROS: sutured wound to right olecranon.      .  Vitals:    10/12/17 1152   BP: 110/68   Pulse: (!) 104   Resp: 20   Temp: 37.2 °C (99 °F)   SpO2: 95%   Weight: 103 kg (227 lb)   Height: 1.727 m (5' 8\")         Assessment: 3 sutures  present. Wound clean, healing well, no s/s of infection    Office visit for suture placement on 09/27/17    Plan: sutures removed without diff, discussed wound care and sunscreen to avoid scars. Noted on chart 5 were placed however even with careful and thorough scab exploration unable to locate the other 2.  Possible have fallen out.   Continue to monitor and return for further removal if necessary    "

## 2017-10-20 PROBLEM — I26.99 PULMONARY EMBOLISM (HCC): Status: ACTIVE | Noted: 2017-01-01

## 2017-10-21 PROBLEM — J96.21 ACUTE AND CHRONIC RESPIRATORY FAILURE WITH HYPOXIA (HCC): Status: ACTIVE | Noted: 2017-01-01

## 2017-10-21 PROBLEM — C18.9 COLON CANCER METASTASIZED TO LUNG (HCC): Status: ACTIVE | Noted: 2017-01-01

## 2017-10-21 PROBLEM — C78.00 COLON CANCER METASTASIZED TO LUNG (HCC): Status: ACTIVE | Noted: 2017-01-01

## 2017-10-21 PROBLEM — C78.7 COLON CANCER METASTASIZED TO LIVER (HCC): Status: ACTIVE | Noted: 2017-01-01

## 2017-10-21 PROBLEM — C18.9 COLON CANCER METASTASIZED TO LIVER (HCC): Status: ACTIVE | Noted: 2017-01-01

## 2017-10-21 PROBLEM — I82.412 ACUTE DEEP VEIN THROMBOSIS (DVT) OF FEMORAL VEIN OF LEFT LOWER EXTREMITY (HCC): Status: ACTIVE | Noted: 2017-01-01

## 2017-10-21 NOTE — ED NOTES
Received call from patient's daughter who has requested an update on patient. Patient has given verbal consent to release information to daughter and give update. ERP on phone with patient's daughter.

## 2017-10-21 NOTE — ASSESSMENT & PLAN NOTE
- seen on imaging  - GI Nachiondo consulted  - Empiric Zosyn  - Status post colonoscopy, path pending  - Will need to establish with oncology outpatient and possibly palliative care per onc eval

## 2017-10-21 NOTE — PROGRESS NOTES
[facility] Hospitalist Progress Note    Date of Service: 10/21/2017    Chief Complaint  67 y.o. female admitted 10/20/2017 with PE and DVT    Interval Problem Update  10/21: Discussed anticoagulation options with patient and family, they chose Lovenox. This was read by pharmacy and hospice presented to patient and family. Passed O2 eval. Discussed w/ GI. ordered PT OT.    Disposition  Likely discharge home tomorrow     Review of Systems   Constitutional: Negative for chills and fever.   Respiratory: Positive for shortness of breath. Negative for cough and wheezing.    Cardiovascular: Positive for leg swelling. Negative for chest pain.   Gastrointestinal: Negative for constipation, diarrhea, nausea and vomiting.   Neurological: Negative for headaches.      Physical Exam  Laboratory/Imaging   Hemodynamics  Temp (24hrs), Av.6 °C (97.9 °F), Min:36.2 °C (97.2 °F), Max:37.5 °C (99.5 °F)   Temperature: 36.5 °C (97.7 °F)  Pulse  Av.5  Min: 100  Max: 108 Heart Rate (Monitored): (!) 105  Blood Pressure : 130/64, NIBP: 102/64      Respiratory      Respiration: 16, Pulse Oximetry: 99 %, O2 Daily Delivery Respiratory : Nasal Cannula     Work Of Breathing / Effort: Mild  RUL Breath Sounds: Clear, RML Breath Sounds: Clear, RLL Breath Sounds: Diminished, JULISSA Breath Sounds: Clear, LLL Breath Sounds: Diminished    Fluids    Intake/Output Summary (Last 24 hours) at 10/21/17 1638  Last data filed at 10/21/17 1000   Gross per 24 hour   Intake              240 ml   Output                0 ml   Net              240 ml       Nutrition  Orders Placed This Encounter   Procedures   • Diet Order     Standing Status:   Standing     Number of Occurrences:   1     Order Specific Question:   Diet:     Answer:   2 Gram Sodium [7]     Physical Exam   Constitutional: She appears well-developed.   HENT:   Head: Normocephalic.   Cardiovascular: Normal rate.  Exam reveals no gallop.    Pulmonary/Chest: No respiratory distress. She has no wheezes.    Abdominal: She exhibits no distension. There is no tenderness.   Musculoskeletal: She exhibits edema and tenderness.   Neurological: She is alert.   Skin: Rash noted. There is erythema.       Recent Labs      10/20/17   1835  10/21/17   0454   WBC  16.8*  18.2*   RBC  4.13*  3.90*   HEMOGLOBIN  9.6*  9.1*   HEMATOCRIT  31.7*  29.7*   MCV  76.8*  76.2*   MCH  23.2*  23.3*   MCHC  30.3*  30.6*   RDW  54.1*  54.0*   PLATELETCT  266  244   MPV  10.1  9.5     Recent Labs      10/20/17   1835  10/21/17   0454   SODIUM  132*  133*   POTASSIUM  3.7  4.3   CHLORIDE  92*  97   CO2  26  22   GLUCOSE  105*  93   BUN  29*  29*   CREATININE  1.03  1.22   CALCIUM  8.6  8.3*     Recent Labs      10/20/17   1835   APTT  28.4   INR  1.30*                  Assessment/Plan     No new Assessment & Plan notes have been filed under this hospital service since the last note was generated.  Service: Hospital Medicine      Reviewed items::  Labs reviewed and Medications reviewed  Magana catheter::  No Magana  DVT prophylaxis pharmacological::  Enoxaparin (Lovenox)

## 2017-10-21 NOTE — ED PROVIDER NOTES
ED Provider Note    ED Provider Note    Primary care provider: Manju Diaz P.A.-C.  Means of arrival: POV  History obtained from: Patient    CHIEF COMPLAINT  Chief Complaint   Patient presents with   • Shortness of Breath   • Sent by MD     Seen at 7:08 PM.   HPI  Denia Landry is a 67 y.o. female who presents to the Emergency DepartmentWith one month of severe dyspnea on exertion, generalized fatigue, decreased appetite. She was evaluated by gastroenterology earlier in the day. The patient had a CT scan of the abdomen and pelvis that revealed thickening of the ascending colon concerning for malignancy. Incidentally, there was a right lower lobe filling defect. The patient had a follow-up CTA of the chest that revealed a right lower lobe pulmonary embolus. The patient was then directed to come to the ER by gastroenterology.    She denies any cough, hemoptysis, black or bloody stools, she does note some lightheadedness and dyspnea on exertion. She also notes long-term bilateral lower extremity edema.     REVIEW OF SYSTEMS  See HPI,   Remainder of ROS negative.     PAST MEDICAL HISTORY   has a past medical history of Acquired lymphedema of leg; Anesthesia; Arthritis; Dental disorder; Other specified symptom associated with female genital organs; and Type II or unspecified type diabetes mellitus without mention of complication, uncontrolled.    SURGICAL HISTORY   has a past surgical history that includes vein stripping (2004); hysteroscopy with video operative (8/20/2009); dilation and curettage (8/20/2009); tonsillectomy (1968); and extensor tendon repair (Left, 11/10/2016).    SOCIAL HISTORY  Social History   Substance Use Topics   • Smoking status: Never Smoker   • Smokeless tobacco: Never Used   • Alcohol use No      History   Drug Use No       FAMILY HISTORY  Family History   Problem Relation Age of Onset   • No Known Problems Mother    • Heart Disease Father    • Diabetes Father        CURRENT  MEDICATIONS  Reviewed.  See Encounter Summary.     ALLERGIES  No Known Allergies    PHYSICAL EXAM  VITAL SIGNS: Pulse (!) 107   Resp 20   Wt 105 kg (231 lb 7.7 oz)   SpO2 97%   BMI 35.20 kg/m² 130/66  Constitutional: Awake, alert in no apparent distress.Obese.  HENT: Normocephalic, Bilateral external ears normal. Nose normal.   Eyes: Conjunctiva normal, non-icteric, EOMI.    Thorax & Lungs:Mild tachypnea, otherwise clear.  Cardiovascular: Tachycardic, Regular rhythm, No murmurs, rubs or gallops.  Abdomen:  Soft, nontender, nondistended, normal active bowel sounds.   :    Skin: Visualized skin is  Dry, No erythema, No rash.   Musculoskeletal: 3+ edema bilateral lower extremities.  Neurologic: Alert, Grossly non-focal.   Psychiatric: Anxious  Lymphatic:  No cervical LAD    EKG   12 lead Interpreted by me  Rhythm: Sinus tachycardia  Rate: 106  Axis: normal  Ectopy: none  Conduction: normal  ST Segments: no acute change  T Waves: no acute change  Clinical Impression: Sinus tach, otherwise normal EKG    RADIOLOGY  No orders to display     The radiologist's interpretation of all radiological studies have been reviewed by me.    COURSE & MEDICAL DECISION MAKING  Pertinent Labs & Imaging studies reviewed. (See chart for details)        7:08 PM - Patient seen and examined at bedside.    Decision Making:  This is a 67 y.o. year old female who presents As a referral from gastroenterology for newly diagnosed pulmonary embolus. The patient does not have any sign of right heart strain. She is not hypoxic. She is mildly tachypneic and mildly tachycardic. Currently she does not require the use of TPA. The patient is quite anxious about the diagnosis. Also she does have some significant fatigue and dyspnea on exertion. Because of this I will admit her for anticoagulation. Incidentally, she has a leukocytosis of unclear etiology today and she has significant LFT abnormalities. I believe the latter is secondary to liver  metastases. She is not having right upper quadrant tenderness, do not suspect acute hepatitis or cholecystitis.    I did discuss the case with the hospitals, the referring gastroenterologist as well as the patient's family as she did want me to discuss the case with her daughter. I did not discuss the case with the patient's , I did attempt to contact him however he was removed from the emergency department premises as he was apparently verbally abusive to the hospital personnel.    Discharge Medications:  New Prescriptions    No medications on file       The patient will be admitted in guarded condition.    FINAL IMPRESSION  1. Other acute pulmonary embolism without acute cor pulmonale (CMS-HCC)    2. Metastatic cancer (CMS-HCC)

## 2017-10-21 NOTE — H&P
DATE OF ADMISSION:  10/20/2017.    CHIEF COMPLAINT:  Referred to the ER by gastroenterology for shortness of   breath and incidental finding of PE.    HISTORY OF PRESENT ILLNESS:  This is a 67-year-old female who was sent to the   ER from the gastroenterologist's office when she is found to have pulmonary   embolus on a CAT scan.  She has a 1-month history of dyspnea on exertion,   marked lower extremity swelling and generalized fatigue.  She has been worked   up as an outpatient for abnormal liver function tests and underwent a CT scan   of the abdomen and pelvis earlier this morning which showed wall thickening of   the ascending colon, highly suspicious for colonic malignancy.  In addition,   multiple liver masses and lung nodules were noted as well as omental   nodularity and mesenteric and pericolonic masses.  These were suspicious for   metastatic lesions.  In addition, she has small amount of perihepatic and   pelvic ascites, retrocrural, retroperitoneal and periportal adenopathy   consistent with metastasis.  There was also a questionable filling defect   noted in the right lower pulmonary artery suspicious for PE.  She then had a   dedicated CT angiogram of the chest earlier this afternoon which confirmed the   right lower lobe pulmonary embolus with no evidence of right ventricular   strain.  The patient was sent to the emergency room and is being admitted for   acute pulmonary embolism management.  She denies a prior history of blood   clots in the lungs or legs.  Her diagnoses of probable malignancy are all new   to her today.  She is hemodynamically in the emergency room on presentation.    REVIEW OF SYSTEMS:  CONSTITUTIONAL:  Positive for weakness, generalized fatigue, malaise for the   past 4-5 weeks.  She has had decreased appetite as well.  HEAD AND NECK:  Denies headache, visual changes, sore throat.  RESPIRATORY:  Positive for shortness of breath at rest and with exertion.  No    hemoptysis.  CARDIOVASCULAR:  She has marked bilateral lower extremity edema as well as   dyspnea on exertion and then some generalized chest wall pain.  GASTROINTESTINAL:  Denies current abdominal pain, nausea, vomiting or   diarrhea.  She has had poor appetite for the past month.  MUSCULOSKELETAL:  No new joint swelling or pain.  SKIN:  No ulcers or rashes.  NEUROLOGIC:  Generalized weakness, not localized on extremities.  PSYCHIATRIC:  Denies depression or psychosis.    PAST MEDICAL HISTORY:  History of acquired lymphedema of both legs, type 2 or   unspecified diabetes without complications.  History of arthritis and dental   disorder.  In addition, she has multiple abnormalities noted earlier today   including the ascending colon thickening suspicion for cancer with multiple   lesions in the peritoneum, liver and lung, suspicious for metastases.    PAST SURGICAL HISTORY:  Vein stripping in 2004, hysteroscopy with video   operative in 2009, dilatation and curettage in 2009, tonsillectomy in 1968 and   extensor tendon repair in November 2016.    SOCIAL HISTORY:  Never smoked cigarettes.  Does not use chewing tobacco and   does not drank alcohol nor use drugs.    FAMILY HISTORY:  Heart disease and diabetes in her father.    ALLERGIES:  No known drug allergies.    MEDICATIONS:  At home, Lasix 40 mg daily, omega-3 fatty acids, fish oil 1000   mg 3 times a day, multiple vitamins with minerals, Centrum Silver 1 tablet   daily,  Vision-Laura preserve, p.o. vitamins 1 tablet daily, Keflex 500 mg 3   times a day for weeping lesions on her lower extremities.    PHYSICAL EXAMINATION:  VITAL SIGNS:  Blood pressure 130/66, pulse of 107, respirations 20, pulse   oximetry 97%.  She weighs 105 kilograms.  CONSTITUTIONAL:  She is awake, alert.  She appears chronically ill, but not in   distress or in severe pain.  HEAD, EYES, EARS, NOSE AND THROAT:  Normocephalic.  Anicteric sclerae,  moist   oral mucosa.  NECK:  Supple, no  jugular venous distention.  No lymphadenopathy.  LUNGS:  Clear to auscultation bilaterally with no wheezing, rales, or rhonchi.  CARDIOVASCULAR:  Mildly tachycardic, regular rhythm.  No murmurs, rubs or   gallops.  ABDOMEN:  Soft, nondistended, nontender.  Bowel sounds normal.  SKIN:  No rashes obvious or no ulcers.  MUSCULOSKELETAL:  3+ pitting edema of both lower extremities.  NEUROLOGIC:  She is awake and alert, diffusely weak, but not focal or   localized to an extremity.  PSYCHIATRIC:  She is pleasant and cooperative.    LABORATORY INVESTIGATIONS:  EKG shows sinus tachycardia, normal axis, no acute   ST or T-wave changes.  White count is 16.8, hemoglobin 9.6, platelet count   266.  She does have a left shift.  Sodium 132, potassium 3.7, chloride 92, CO2   of 26, anion gap 14, glucose 105, BUN 29, creatinine 1.03, calcium 8.6, AST   249, ALT 89, alkaline phosphatase 589, bilirubin 6.8, albumin 2.3.  Troponin   0.04, INR 1.3, PT 16.  Recent imaging including CT scan of the abdomen and   pelvis done this morning showed wall thickening of the ascending colon, highly   suspicious for colonic malignancy, hepatomegaly with multiple rim enhancing   masses consistent with metastatic disease, omental nodularity with mesenteric   pericolonic masses also consistent with carcinomatosis.  Small amount of   perihepatic and pelvic ascites, retrocrural, retroperitoneal and periportal   adenopathy consistent with metastasis.  Multiple bilateral pulmonary nodules   consistent with metastatic disease.  Questionable filling defect noted in the   right lower lobe pulmonary artery was noted and a CT angio was suggested.  CT   angiogram of the chest was done next earlier this afternoon showing a right   lower lobe pulmonary embolus with no CT evidence of right ventricular strain.    Multiple scattered pulmonary nodules noted suspicious for metastatic disease   as mentioned above.  right hilar adenopathy also suspicious for  metastasis.    ASSESSMENT AND PLAN:  Acute right lower lobe pulmonary embolism.  She has no   hemodynamic compromise.  We will admit the patient to hospital and start her   on Lovenox therapeutic dosing at 1 mg/kg every 12 hours.  The patient had a   recent echocardiogram on October 7th showing a normal left ventricular   ejection fraction.    Because of the marked lower extremity edema, we will also obtain vascular   Doppler ultrasounds of the legs.    Leukocytosis, which may be on the basis of infection, but more likely from the   widespread malignancy with metastases.    Ascending colon thickening suspicion for colon cancer with widespread   metastases in the lung, liver, peritoneum.  We will require GI and   hematology/oncology follow up for further management.    Code status.  I have discussed with her in the emergency room and she wishes   to be a full code.    Deep venous thrombosis prophylaxis.  Since she will be on therapeutic dosing   Lovenox, she does not require additional prophylactic measures.       ____________________________________     MD CORY Pizano / EVANGELISTA    DD:  10/21/2017 00:06:36  DT:  10/21/2017 00:59:10    D#:  [id number]  Job#:  [id number]

## 2017-10-21 NOTE — DISCHARGE PLANNING
ALEXEY Alcala called [facility] pharmacy on [phone] and spoke to Jeffrey who stated they did not receive it.  ALEXEY verified fax number [phone] and refaxed RX.   ALEXEY spoke to bs IVA Nuno to request follow up on cost by calling [phone].

## 2017-10-21 NOTE — DISCHARGE PLANNING
ALEXEY Alcala was given RX for Lovenox and faxed to [facility] on [address].  ALEXEY will follow up ([phone]) no later than 1400 for cost.

## 2017-10-21 NOTE — FLOWSHEET NOTE
10/21/17 0215   Type of Assessment   Assessment Yes   Patient History   Pulmonary Diagnosis PE   Surgical Procedures None   Home O2 No   Home Treatments/Frequency No   COPD Risk Screening   Do you have a history of COPD? No   COPD Population Screener   During the past 4 weeks, how much did you feel short of breath? 1   Do you ever cough up any mucus or phlegm? 0   In the past 12 months, you do less than you used to because of your breathing problems 0   Have you smoked at least 100 cigarettes in your entire life? 2   How old are you? 2   COPD Screening Score 5   Smoking History   Have you Ever Smoked Never   Level Of Consciousness   Level of Consciousness Alert   Respiratory WDL   Respiratory (WDL) X   Chest Exam   Respiration 18   Pulse (!) 101   Breath Sounds   RUL Breath Sounds Clear   RML Breath Sounds Clear   RLL Breath Sounds Diminished   JULISSA Breath Sounds Clear   LLL Breath Sounds Diminished   Oximetry   #Pulse Oximetry (Single Determination) Yes   Oxygen   Home O2 Use Prior To Admission? No   Pulse Oximetry 98 %   O2 (LPM) 3   O2 Daily Delivery Respiratory  Nasal Cannula

## 2017-10-21 NOTE — ED NOTES
ERP at bedside. Pt agrees with plan of care discussed by ERP. AIDET acknowledged with patient. Wil in low position, side rail up for pt safety. Call light within reach. Will continue to monitor.

## 2017-10-21 NOTE — ED NOTES
"Pt's  called and demanded to speak to \"someone in charge.\"  His speech is slurred and he is angry that he was \"thrown out.\"  He told me that I \"better let me back in the room because my wife is critically ill.\" I explained that he would not be allowed back. He became beligerant and profane; I hung up the phone.   "

## 2017-10-21 NOTE — PROGRESS NOTES
H and P dictated #[id number]. 68 yo F with acute PE noted incidentally by her GI doctor when she had abd/pelvis CT today. She has new findings of ascending colon thickening suspicious for colon cancer with multiple lesions in lungs, liver and peritoneum likely representing widespread metastatic disease. Started lovenox sq therapeutic dosing. Hemodynamically stable.

## 2017-10-21 NOTE — PROGRESS NOTES
MD rounded with pt and family this morning to discuss DVT/PE and anticoagulation. Questions/concerns addressed. Pt up to bathroom with FWW, PT/OT ordered. Will ambulate with oximetry. No other changes in pt status.    Tele strip at 0756 shows ST w/ HR of 106  Measurements: .12/.08/.30    Tele Shift Summary:  Rhythm: ST  Rate: 100's  Ectopy: Per CCT Lissette, pt had no ectopy.    Telemetry monitoring strips placed in pt chart.

## 2017-10-21 NOTE — DISCHARGE PLANNING
Medical Social Work    Referral: ALEXEY reviewed the chart this AM.      Intervention: Per flowsheet, pt lives with spouse and expects to d.c home.  Pt is currently on 3L O2 and no home O2 noted.  No SS or DC needs at this time.      Plan: ALEXEY Alcala available to assist with any d.c planning.      Care Transition Team Assessment    Information Source  Orientation : Oriented x 4  Information Given By: Patient    Readmission Evaluation  Is this a readmission?: No    Elopement Risk  Legal Hold: No  Ambulatory or Self Mobile in Wheelchair: No-Not an Elopement Risk  Elopement Risk: Not at Risk for Elopement    Interdisciplinary Discharge Planning  Does Admitting Nurse Feel This Could be a Complex Discharge?: No  Primary Care Physician:  (Spouse to bring info tomorrow)  Lives with - Patient's Self Care Capacity: Spouse  Patient or legal guardian wants to designate a caregiver (see row info): No  Support Systems: Children, Family Member(s)  Housing / Facility: [address]  Do You Take your Prescribed Medications Regularly: Yes  Able to Return to Previous ADL's: Yes  Mobility Issues: No  Prior Services: None  Patient Expects to be Discharged to:: Home  Assistance Needed: No  Durable Medical Equipment:  (Needs a walker; uses 's cane)    Discharge Preparedness  What is your plan after discharge?: Uncertain - pending medical team collaboration  What are your discharge supports?: Spouse              Vision / Hearing Impairment  Vision Impairment : Yes  Right Eye Vision: Blind  Left Eye Vision: Impaired, Wears Glasses  Hearing Impairment : No    Values / Beliefs / Concerns  Values / Beliefs Concerns : No    Advance Directive  Advance Directive?: None    Domestic Abuse  Have you ever been the victim of abuse or violence?: No  Physical Abuse or Sexual Abuse: No  Verbal Abuse or Emotional Abuse: No  Possible Abuse Reported to:: Not Applicable    Psychological Assessment  History of Substance Abuse: None

## 2017-10-21 NOTE — PROGRESS NOTES
Bedside report received from Jimmy ENRIQUE. Assumed care. POC discussed. Pt resting comfortably in bed. Safety precautions in place.

## 2017-10-21 NOTE — ED NOTES
Report received from ER triage RN. Per RN, patient's  has been very abusive to ED staff. Security informed about patient's behavior. Patient's  back to see patient,  refusing to keep curtain closed despite being informed that it is a HIPPA violation due to hallway patient's. Security called to escort patient out due to abusive behavior towards ED staff. Patient out with security.

## 2017-10-21 NOTE — ED NOTES
Patient sitting up on side of bed, pt reports that she is most comfortable in sitting position, continuous monitoring in place, call light within reach, sydnie in lowest position.

## 2017-10-21 NOTE — ASSESSMENT & PLAN NOTE
- seen on imaging  - GI Nachiondo consulted  - Status post colonoscopy, path pending  - Will need to establish with oncology outpatient and possibly palliative care per onc eval

## 2017-10-21 NOTE — CARE PLAN
Problem: Communication  Goal: The ability to communicate needs accurately and effectively will improve  Outcome: PROGRESSING AS EXPECTED  POC discussed with pt, family and MD at bedside. Questions/concerns addressed. Pt verbalizes understanding of current POC.    Problem: Venous Thromboembolism (VTW)/Deep Vein Thrombosis (DVT) Prevention:  Goal: Patient will participate in Venous Thrombosis (VTE)/Deep Vein Thrombosis (DVT)Prevention Measures  Outcome: PROGRESSING AS EXPECTED  Pt on Lovenox. Demonstrated use to patient, pt verbalizes understanding at this time. Will demonstrate back with tonight's dose.

## 2017-10-21 NOTE — PROGRESS NOTES
Spoke with [facility]; states copay will be $200 for 30 day supply of lovenox. Pt and family updated.

## 2017-10-21 NOTE — PROGRESS NOTES
Received report from IVA Amaral. Plan of care discussed. Patient to be seen by MD on Med/Tele floor. Waiting on patient to be transported to room.

## 2017-10-21 NOTE — CARE PLAN
Problem: Knowledge Deficit  Goal: Knowledge of disease process/condition, treatment plan, diagnostic tests, and medications will improve  Outcome: PROGRESSING AS EXPECTED  Assess understanding of disease process, treatment plan, diagnostic testing, and medications and educate patient and family accordingly. Have patient and family verbalize understanding.

## 2017-10-21 NOTE — PROGRESS NOTES
The Medication Reconciliation process has been completed by interviewing the patient    Allergies have been reviewed  Antibiotic use in 30 days - started on keflex wed 10/18 per patient for leg drainage    Home Pharmacy:  [facility] - [address]

## 2017-10-21 NOTE — CARE PLAN
Problem: Safety  Goal: Will remain free from falls  Outcome: PROGRESSING AS EXPECTED  Patient will use call light appropriately and assistance will be provided. Call light and personal belongings will be within reach.

## 2017-10-21 NOTE — PROGRESS NOTES
Assessment completed. Due meds given. Pt AAOx4, spouse at bedside, no c/o pain, SOB, n/v at this time. RLE is cool, weeping, LLE is warm/red.  US of LE's completed this AM. Spoke with MD about result. Pt up to bathroom with FWW, x2 assist. No additional needs, will monitor.

## 2017-10-22 NOTE — CONSULTS
DATE OF SERVICE:  10/22/2017    GASTROENTEROLOGY CONSULTATION    CONSULTATION REQUESTED BY:  Gonzales Schwartz MD    REASON FOR CONSULTATION:  Assistance in management of the patient with   jaundice and probable metastatic colon cancer.    IDENTIFICATION:  A 67-year-old  female.    CHIEF COMPLAINT:  Fatigue, shortness of breath, and itching.    HISTORY OF PRESENT ILLNESS:  History was obtained via interview of the patient   and her younger sister.  Additionally, GI consultant's records and [facility]   records were reviewed.  The patient was actually seen by me in clinic earlier   last week.  She had been referred for concern for jaundice and imaging showing   masses in the liver.  I obtained CT imaging of the abdomen and pelvis,   completed 2 days ago on 10/20/2017 at the [facility] outpatient imaging center.    The radiologist called noting wall thickening of the ascending colon, highly   suspicious for a colonic malignancy, hepatomegaly with multiple coalescing   hypodense rim enhancing masses consistent with metastatic disease was noted,   omental nodularity with mesenteric and pericolonic masses consistent with   carcinomatosis was noted, small amount of pelvic ascites was noted,   retrocrural, retroperitoneal and periportal adenopathy consistent with   metastases was noted, multiple bilateral pulmonary nodules consistent with   metastatic disease was noted, questionable filling defect in the right lower   lobe pulmonary artery on single image, possibly consistent with pulmonary   embolism was noted.  The radiologist asked if I would like to conduct CT of   the chest to rule out PE.  At that point, we did move forward with that and   indeed PE protocol CT imaging of the chest on October 20th showed a right   lower lobe pulmonary embolism.  At that point, the patient was instructed to   report to the [facility] ER Emergency Department, which she complied with.    She was admitted and has now been placed on  Lovenox.  The hospitalists have   graciously been caring for the patient.  The hospitalist called this morning   asking for our assistance in reaching a definitive tissue diagnosis to help in   planning/management for the patient as well as her family members.  Initially   in my clinic, the patient was adamantly opposed to colonoscopy.  At this   point, she is agreeable to moving forward with colonoscopy if it will help   facilitate her diagnosis and discharge.    Here, her labs are notable for a low MCV anemia, hemoglobin yesterday was 9.1   with an MCV of 76, platelet count was normal, and white blood cell count was   slightly elevated at 18,000.  Sodium was low at 133, BUN 29, creatinine 1.22,   , ALT 87, alkaline phosphatase 590, and total bilirubin 6.8.  Total   bilirubin had been 4.3 on October 16th.  Troponin I was normal on October 20th.  Carcinoembryonic antigen is 1586.  Ferritin is 21.  Alpha fetoprotein   is 2.  Hepatitis A, B, and C virus serologies are negative.  Antimitochondrial   antibody is negative.    The patient does describe some shortness of breath, which has been worsening   over the prior 1 month.  She has been fatigued.  No visible bright red blood   per rectum or black tarry stools.  No prior history of PE.  No known family   history of carcinoma of the GI tract.    PAST MEDICAL HISTORY:  1.  Diabetes mellitus type 2.  2.  Osteoarthritis.  3.  Obesity.    PAST SURGICAL HISTORY:  1.  Vein stripping in 2004.  2.  Hysteroscopy with video in 2009.  3.  Dilation and curettage of the uterus in 2009.  4.  Tonsillectomy in 1968.  5.  Extensor tendon repair in 2016.    SOCIAL HISTORY:  No tobacco history.  No alcohol or drug use or abuse.    FAMILY HISTORY:  Noncontributory, specifically no luminal GI disease, liver   disease, or pancreatic disease.    REVIEW OF SYSTEMS:  A 14-point review of systems is negative except as noted   above.  Please see HPI.    PHYSICAL EXAMINATION:  GENERAL:   Obese  female, in no immediate distress, cooperative and   appropriate, friendly.  VITAL SIGNS:  Afebrile, heart rate is 96, respiratory rate 18, blood pressure   111/64, oxygen saturations 99% on room air.  HEENT:  Normocephalic and atraumatic.  Sclerae icteric.  Conjunctivae pink.    Oropharynx:  Moist and clear with a Mallampati score of 3.  NECK:  No thyroid abnormality or lymphadenopathy noted.  LUNGS:  Clear to auscultation, poor inspiratory effort.  CARDIOVASCULAR:  Tachycardic, but regular.  No audible murmur.  ABDOMEN:  Obese.  Bowel sounds present, soft, no masses noted.  No tenderness   noted.  No rebound or guarding.  EXTREMITIES:  2+ pitting edema to the knees bilaterally.    LABORATORY DATA:  See HPI.    IMAGING STUDIES:  See HPI.    IMPRESSION:  A 67-year-old  female with apparent metastatic   carcinoma, likely colon cancer primary source.  The patient now has pulmonary   embolism in this hypercoagulable state as well.  She is anticoagulated with   Lovenox at the present time.  We discussed options including colonoscopy to   attempt a tissue diagnosis and then referral to oncology versus   radiology-guided liver biopsy versus no further diagnostics.  At this time,   the patient would like to move forward with colonoscopy during this   hospitalization.  I think it would be prudent to do her colonoscopy during the   hospital stay given the fact that her anticoagulation will need to be   interrupted.  Briefly, we will try to do this first brief time as possible.    The patient did receive her Lovenox this morning.  Tentatively, we are   planning to conduct colonoscopy tomorrow morning.    PROBLEM LIST:  1.  Pruritus -- secondary to hyperbilirubinemia, which is secondary to   metastatic lesions from colon cancer primary.  2.  Fatigue -- see above.  3.  Dyspnea and shortness of breath -- see above.  4.  Pulmonary embolism.  5.  Jaundice.  6.  Abnormal CT imaging -- see above.  7.   Obesity.  8.  Anticoagulation.  9.  Leukocytosis -- concerning for possible mounting cholangitis.  I am going   to recommend broadening the patient's antibiotic coverage to Zosyn at this   time.  10.  Edema.    PLAN AND RECOMMENDATIONS:  1.  Morning labs to include CBC, CMP, INR.  2.  Colonoscopy tentatively scheduled for tomorrow morning.  Due to her   obesity and comorbidities, I recommend anesthesiologist assistance.  I have   contacted the house supervisor, who will help arrange for tomorrow morning.  3.  Intravenous iron infusion while the patient is in-house.  4.  Change antibiotics coverage to Zosyn.  5.  I will discuss the case with Dr. Amador, who will assume care for our   practice tomorrow.    Thank you for allowing me to participate in the care of this patient.       ____________________________________     MD JOHN FRITZ / EVANGELISTA    DD:  10/22/2017 11:44:41  DT:  10/22/2017 12:41:07    D#:  [id number]  Job#:  [id number]    cc: SUJEY HUDSON MD

## 2017-10-22 NOTE — PROGRESS NOTES
Telemetry Summary    Rhythm Interpretation: Sinus Rhythm  Heart Rate: 98  UT Interval: 0.16  QRS Interval: 0.06  QT Interval: 0.34

## 2017-10-22 NOTE — PROGRESS NOTES
Spoke with pt and family at length about anticoagulation options. Provided with THUY information on coumadin. Pt and family would like to discuss options further with MD and ALEXEY tomorrow. Pt walked with oximetry, 94% with no O2, tolerated well. No other needs/complaints. Bedside report given to Jimmy ENRIQUE. POC discussed. Pt resting comfortably in bed. Safety precautions in place.

## 2017-10-22 NOTE — THERAPY
PT orders received, eval attempted x2. Pt declined to mobilize with PT at this time. Is awaiting antibiotics and anticipates going for colonoscopy tomorrow. Pt and family both feel pt will require FWW upon D/C home, spouse uses a SPC.  Prefers to defer PT eval until tomorrow after colonoscopy. Will reattempt PT eval as able.

## 2017-10-22 NOTE — PROGRESS NOTES
Received bedside report from IVA Nuno. Plan of care discussed. Safety precautions in place. Call light and personal belongings within reach. Patient has no needs at this time.

## 2017-10-22 NOTE — PROGRESS NOTES
[facility] Hospitalist Progress Note    Date of Service: 10/22/2017    Chief Complaint  67 y.o. female admitted 10/20/2017 with PE and DVT    Interval Problem Update  10/21: Discussed anticoagulation options with patient and family, they chose Lovenox. This was read by pharmacy and hospice presented to patient and family. Passed O2 eval. Discussed w/ GI. ordered PT OT.  10/22: WBC is increasing. GI started Zosyn and repleting with IV iron. She has been titrated off O2. Prepping for colonoscopy tomorrow    Disposition  Prepping for colonoscopy tomorrow     Review of Systems   Constitutional: Negative for chills and fever.   Respiratory: Positive for shortness of breath. Negative for cough and wheezing.    Cardiovascular: Positive for leg swelling. Negative for chest pain.   Gastrointestinal: Negative for constipation, diarrhea, nausea and vomiting.   Neurological: Negative for headaches.   Psychiatric/Behavioral: Negative for depression. The patient is nervous/anxious.       Physical Exam  Laboratory/Imaging   Hemodynamics  Temp (24hrs), Av.4 °C (97.5 °F), Min:36.2 °C (97.1 °F), Max:36.6 °C (97.9 °F)   Temperature: 36.5 °C (97.7 °F)  Pulse  Av  Min: 96  Max: 108   Blood Pressure : 111/64      Respiratory      Respiration: 18, Pulse Oximetry: 99 %     Work Of Breathing / Effort: Mild  RUL Breath Sounds: Clear, RML Breath Sounds: Clear, RLL Breath Sounds: Diminished, JULISSA Breath Sounds: Clear, LLL Breath Sounds: Diminished    Fluids    Intake/Output Summary (Last 24 hours) at 10/22/17 1607  Last data filed at 10/22/17 1300   Gross per 24 hour   Intake              600 ml   Output                0 ml   Net              600 ml       Nutrition  Orders Placed This Encounter   Procedures   • DIET NPO     Standing Status:   Standing     Number of Occurrences:   1     Order Specific Question:   Restrict to:     Answer:   Sips with Medications [3]     Physical Exam   Constitutional: She appears well-developed.   HENT:    Head: Normocephalic.   Eyes: Conjunctivae are normal.   Cardiovascular: Normal rate.  Exam reveals no gallop.    Pulmonary/Chest: No respiratory distress. She has no wheezes. She has no rales.   Abdominal: She exhibits no distension. There is no tenderness.   Musculoskeletal: She exhibits edema and tenderness.   Neurological: She is alert.   Skin: Rash noted. There is erythema.       Recent Labs      10/20/17   1835  10/21/17   0454  10/22/17   1208   WBC  16.8*  18.2*  20.5*   RBC  4.13*  3.90*  3.99*   HEMOGLOBIN  9.6*  9.1*  9.4*   HEMATOCRIT  31.7*  29.7*  31.1*   MCV  76.8*  76.2*  77.9*   MCH  23.2*  23.3*  23.6*   MCHC  30.3*  30.6*  30.2*   RDW  54.1*  54.0*  56.4*   PLATELETCT  266  244  264   MPV  10.1  9.5  9.5     Recent Labs      10/20/17   1835  10/21/17   0454   SODIUM  132*  133*   POTASSIUM  3.7  4.3   CHLORIDE  92*  97   CO2  26  22   GLUCOSE  105*  93   BUN  29*  29*   CREATININE  1.03  1.22   CALCIUM  8.6  8.3*     Recent Labs      10/20/17   1835   APTT  28.4   INR  1.30*                  Assessment/Plan     Colon cancer metastasized to lung (CMS-HCC)- (present on admission)   Assessment & Plan    - seen on imaging  - GI Nachiondo consulted  - Prepping for colonoscopy tomorrow        Colon cancer metastasized to liver (CMS-HCC)- (present on admission)   Assessment & Plan    - seen on imaging  - GI Nachiondo consulted  - Prepping for colonoscopy tomorrow  - Empiric Zosyn        Acute deep vein thrombosis (DVT) of femoral vein of left lower extremity (CMS-HCC)- (present on admission)   Assessment & Plan    - Seen on ultrasound  - Lovenox given concurrent cancer        Acute and chronic respiratory failure with hypoxia (CMS-HCC)- (present on admission)   Assessment & Plan    - Due to PE        Pulmonary embolism (CMS-HCC)- (present on admission)   Assessment & Plan    - found incidentally  - lovenox            Reviewed items::  Labs reviewed and Medications reviewed  Magana catheter::  No  Magana  DVT prophylaxis pharmacological::  Enoxaparin (Lovenox)

## 2017-10-22 NOTE — CARE PLAN
Problem: Discharge Barriers/Planning  Goal: Patient's continuum of care needs will be met  Outcome: PROGRESSING AS EXPECTED  Discuss and explain to the patient and family the plan of care after hospitalization. Assess additional education and services/needs required for discharge. Collaborate with the interdisciplinary team to meet discharge goals/needs.     Problem: Mobility  Goal: Risk for activity intolerance will decrease  Outcome: PROGRESSING AS EXPECTED  Have patient collaborate with PT/OT to assess needs to maintain or improve ADLs. Ambulating the patient at least three times a day to build up strength.

## 2017-10-22 NOTE — PROGRESS NOTES
IV Iron Per Pharmacy Note    Patient Lean Body Weight:  63.9 kg  Reason for Iron Replacement: Iron Deficiency Anemia     10/22/2017 12:08   Iron 22 (L)   Total Iron Binding 339   % Saturation 6 (L)       Lab Results   Component Value Date/Time    WBC 20.5 (H) 10/22/2017 12:08 PM    WBC 9.9 07/10/2009 12:22 PM    RBC 3.99 (L) 10/22/2017 12:08 PM    RBC 4.57 07/10/2009 12:22 PM    HEMOGLOBIN 9.4 (L) 10/22/2017 12:08 PM    HEMATOCRIT 31.1 (L) 10/22/2017 12:08 PM    MCV 77.9 (L) 10/22/2017 12:08 PM    MCV 89 07/10/2009 12:22 PM    MCH 23.6 (L) 10/22/2017 12:08 PM    MCH 29.7 07/10/2009 12:22 PM    MCHC 30.2 (L) 10/22/2017 12:08 PM    MPV 9.5 10/22/2017 12:08 PM               Recent Labs      10/21/17   0454   CREATININE  1.22          Assessment/Plan:  1. IV Iron Indicated.   2. Give Iron Dextran 25 mg IV test dose following diphenhydramine/acetaminophen premeds over 30 minutes per protocol.  3. If no reaction (Anaphylaxis, Hypotension/Hypertension, N/V/D, Chest pain/Back Pain, Urticaria/Pruritis) in the next hour, proceed to full dose. Nursing to call the pharmacy at [phone]. [phone] for full dose.  4. Full dose: Iron Dextran 1675 mg IV over 4 hours. Continue to monitor for delayed ADR including: Arthralgia/myalgia, Headache/backache, chills/dizziness/malaise, moderate to high fever and n/vPattie Cavanaugh PharmD

## 2017-10-23 NOTE — OR SURGEON
Immediate Post OP Note    PreOp Diagnosis: Ascending colon and multiple liver lesions on CT scan    PostOp Diagnosis:   1. Ascending colon mass at 110 cm, biopsied.  2. Benign appearing polyps, not biopsied or removed (transverse at 70 cm, sigmoid at 20 cm).  3. Polypoid distal rectal mass with eroded head, 1.4 cm diameter, biopsied.    Procedure(s):  COLONOSCOPY - ENDO - Wound Class: Clean Contaminated    Surgeon(s):  Talon Amador M.D.    Anesthesiologist/Type of Anesthesia:  Anesthesiologist: Guille Mendes M.D./* No anesthesia type entered *    Surgical Staff:  Circulator: Luis Fernando Strange R.N.  Endoscopy Technician: Nancy Martin    Specimens:  Ascending colon mass  Distal sigmoid polypoid lesion    Estimated Blood Loss: < 3 mL    Findings: As above    Complications: None        10/23/2017 12:22 PM Talon Amador

## 2017-10-23 NOTE — PROGRESS NOTES
[facility] Hospitalist Progress Note    Date of Service: 10/23/2017    Chief Complaint  67 y.o. female admitted 10/20/2017 with PE and DVT    Interval Problem Update  10/21: Discussed anticoagulation options with patient and family, they chose Lovenox. This was read by pharmacy and hospice presented to patient and family. Passed O2 eval. Discussed w/ GI. ordered PT OT.  10/22: WBC is increasing. GI started Zosyn and repleting with IV iron. She has been titrated off O2. Prepping for colonoscopy tomorrow  10/23: Colonoscopy successful. Resuming Lovenox. Zofran when necessary for nausea. Showed CT image to  and explained diagnosis and prognosis. Will need to establish with oncology outpatient.    Disposition  DC home tomorrow with oncology outpatient follow up    Review of Systems   Constitutional: Positive for malaise/fatigue (Sleepy). Negative for chills and fever.   Respiratory: Positive for shortness of breath. Negative for cough and wheezing.    Cardiovascular: Positive for leg swelling. Negative for chest pain.   Gastrointestinal: Negative for constipation, diarrhea, nausea and vomiting.   Neurological: Negative for dizziness and headaches.   Psychiatric/Behavioral: Negative for depression. The patient is nervous/anxious.       Physical Exam  Laboratory/Imaging   Hemodynamics  Temp (24hrs), Av.5 °C (97.7 °F), Min:36.1 °C (97 °F), Max:36.9 °C (98.4 °F)   Temperature: 36.9 °C (98.4 °F)  Pulse  Av.6  Min: 85  Max: 108 Heart Rate (Monitored): 92  Blood Pressure : 120/75, NIBP: (!) 97/61      Respiratory      Respiration: 16, Pulse Oximetry: 99 %     Work Of Breathing / Effort: Mild  RUL Breath Sounds: Clear, RML Breath Sounds: Clear, RLL Breath Sounds: Diminished, JULISSA Breath Sounds: Clear, LLL Breath Sounds: Diminished    Fluids    Intake/Output Summary (Last 24 hours) at 10/23/17 1547  Last data filed at 10/22/17 1800   Gross per 24 hour   Intake              830 ml   Output                0 ml   Net               830 ml       Nutrition  Orders Placed This Encounter   Procedures   • DIET ORDER     Standing Status:   Standing     Number of Occurrences:   1     Order Specific Question:   Diet:     Answer:   Low Fiber(GI Soft) [2]     Physical Exam   Constitutional: She appears well-developed.   HENT:   Head: Normocephalic.   Eyes: Conjunctivae are normal.   Cardiovascular: Normal rate.    Pulmonary/Chest: No respiratory distress. She has no wheezes. She has no rales.   Abdominal: She exhibits no distension. There is tenderness. There is no rebound and no guarding.   Musculoskeletal: She exhibits edema and tenderness.   Neurological: She is alert.   Skin: Rash noted. There is erythema.       Recent Labs      10/21/17   0454  10/22/17   1208  10/23/17   0452   WBC  18.2*  20.5*  16.6*   RBC  3.90*  3.99*  3.93*   HEMOGLOBIN  9.1*  9.4*  9.3*   HEMATOCRIT  29.7*  31.1*  30.6*   MCV  76.2*  77.9*  77.9*   MCH  23.3*  23.6*  23.7*   MCHC  30.6*  30.2*  30.4*   RDW  54.0*  56.4*  55.8*   PLATELETCT  244  264  226   MPV  9.5  9.5  9.7     Recent Labs      10/20/17   1835  10/21/17   0454  10/23/17   0452   SODIUM  132*  133*  132*   POTASSIUM  3.7  4.3  3.9   CHLORIDE  92*  97  95*   CO2  26  22  22   GLUCOSE  105*  93  102*   BUN  29*  29*  41*   CREATININE  1.03  1.22  1.32   CALCIUM  8.6  8.3*  8.6     Recent Labs      10/20/17   1835  10/23/17   0452   APTT  28.4   --    INR  1.30*  1.52*                  Assessment/Plan     Colon cancer metastasized to lung (CMS-HCC)- (present on admission)   Assessment & Plan    - seen on imaging  - GI Nachiondo consulted  - Status post colonoscopy, path pending  - Will need to establish with oncology outpatient and possibly palliative care per onc eval        Colon cancer metastasized to liver (CMS-HCC)- (present on admission)   Assessment & Plan    - seen on imaging  - GI Nachiondo consulted  - Empiric Zosyn  - Status post colonoscopy, path pending  - Will need to establish with  oncology outpatient and possibly palliative care per onc eval        Acute deep vein thrombosis (DVT) of femoral vein of left lower extremity (CMS-HCC)- (present on admission)   Assessment & Plan    - Seen on ultrasound  - Lovenox given concurrent cancer        Acute and chronic respiratory failure with hypoxia (CMS-HCC)- (present on admission)   Assessment & Plan    - Due to PE        Pulmonary embolism (CMS-HCC)- (present on admission)   Assessment & Plan    - found incidentally  - lovenox            Reviewed items::  Labs reviewed and Medications reviewed  Magana catheter::  No Magana  DVT prophylaxis pharmacological::  Enoxaparin (Lovenox)

## 2017-10-23 NOTE — OP REPORT
DATE OF SERVICE:  10/23/2017    PREPROCEDURE DIAGNOSES:  Ascending colon mass, multiple liver lesions, and   omental caking and adenopathies, suspicious for metastatic disease.    POSTPROCEDURE DIAGNOSES:  1.  Ascending colon mass, partially visualized, located 110 cm from the anal   verge in the ascending colon, biopsied.  2.  Benign appearing polyps x2 (transverse at 70 and sigmoid at 20), not   removed or biopsied.  3.  Polypoid distal rectal mass with eroded head, 1.4 cm diameter, biopsied.    PROCEDURE PERFORMED:  Colonoscopy to the ascending colon at 110 cm with   biopsies.    DESCRIPTION OF PROCEDURE:  The risks, benefits, and alternatives were   explained to the patient in front of her daughter and .  Consent was   obtained.  She was brought to the endoscopy suite where she was put under   monitored anesthesia care per Dr. Mendes.  A digital rectal examination   revealed a mobile polypoid mass, left side of the rectum, just proximal to the   sphincter.  The Olympus flexible video colonoscope was introduced in the   rectum and advanced under direct visualization.  The bowel prep was   compromised in some areas with turbid fluid and some retained particulate   matter.  She had a very tortuous colon and with extrinsic compression, I was   able to get around the hepatic flexure into the ascending colon.  At this   point, I could not see where the lumen went, but saw a malignant appearing   nodular friable mass.  This was located at 110 cm from the anal verge with the   scope straightened.  I took multiple biopsies (specimen A).  On withdrawal, a   benign appearing estimated 8-9 mm polyp was seen in the transverse colon at   70 cm and a small 3-4 mm polyp in the sigmoid colon at 20 cm.  On close   inspection with the high definition scope, they appeared to be benign tubular   adenomas.  I elected not to take biopsies or remove them at this time.  In the   distal rectum, there was a somewhat unusual  appearing distal rectal polypoid   mass.  It was not at the anal verge; therefore, not a hypertrophied anal   papilla.  It appeared to have a submucosal component.  It was firm when   manipulated with closed biopsy forceps.  About the proximal three-fourths had   benign overlying mucosa and the tip had erythematous, edematous mucosa.  This   may be from prolapse.  I took multiple biopsies of this lesion (specimen B).    I suspect she has stage IV colon cancer with the primary in the ascending   colon, multiple liver mets, adenopathy, and carcinomatosis.    PLAN:  Await biopsies.  Oncology opinion regarding palliative care.       ____________________________________     TAMARA SOLIS MD CMS / EVANGELISTA    DD:  10/23/2017 12:32:17  DT:  10/23/2017 12:49:35    D#:  [id number]  Job#:  [id number]

## 2017-10-23 NOTE — THERAPY
PT NOTE: MD orders received for PT eval, chart reviewed, PT eval on HOLD- Pt had colonoscopy today, is still drowsy from anesthesia and per RN is not approp for PT, will f/u tomorrow

## 2017-10-23 NOTE — PROGRESS NOTES
Family present in room asking questions regarding plan of care. Plan of care updated. Assessment and medication administration complete.

## 2017-10-23 NOTE — PROGRESS NOTES
Received bedside report from IVA Saldana. Plan of care discussed. Safety precautions in place. Call light and personal belongings within reach. Patient has no needs at this time.

## 2017-10-23 NOTE — OR NURSING
1235: To PACU post colonoscopy. Pt is extubated, sleeping, breathing is spontaneous and unlabored.  1251: Report given to SCOTT Salinas RN.

## 2017-10-23 NOTE — CARE PLAN
Problem: Knowledge Deficit  Goal: Knowledge of disease process/condition, treatment plan, diagnostic tests, and medications will improve  Outcome: PROGRESSING AS EXPECTED  Reinforce education to patient and family as necessary to improve understanding of disease process, treatment, diagnostic tests, and medications.    Problem: Pain Management  Goal: Pain level will decrease to patient's comfort goal  Outcome: PROGRESSING AS EXPECTED  Collaborate with patient and interdisciplinary team to maintain pain at a manageable level.

## 2017-10-24 NOTE — PROGRESS NOTES
Gastroenterology Progress Note     Author: Talon PEGGY Amador   Date & Time Created: 10/24/2017 11:17 AM    Interval History:  Problem: RUQ pain, liver dysfunction, and apparent metastatic cancer    This is a 67 year old woman who complains of onset of RUQ pain, fatigue, and weakness x 1 month.  She was seen in our office and a CT scan was ordered.    CT scan of the abdomen and pelvis:  wall thickening of the ascending colon, highly   suspicious for colonic malignancy, hepatomegaly with multiple rim enhancing   masses consistent with metastatic disease, omental nodularity with mesenteric   pericolonic masses also consistent with carcinomatosis.  Small amount of   perihepatic and pelvic ascites, retrocrural, retroperitoneal and periportal   adenopathy consistent with metastasis.  Multiple bilateral pulmonary nodules   consistent with metastatic disease.  Questionable filling defect noted in the   right lower lobe pulmonary artery was noted and a CT angio was suggested.  CT   angiogram of the chest was done next showing a right   lower lobe pulmonary embolus with no CT evidence of right ventricular strain.    Multiple scattered pulmonary nodules noted suspicious for metastatic disease   as mentioned above.  right hilar adenopathy also suspicious for metastasis.    Colonoscopy yesterday revealed a partially visualized malignant appearing mass in the ascending colon, 2 small benign appearing polyps and a tubular semipedunculated lesion in the distal rectum.  Biopsies of the ascending colon mass and rectal lesion are pending.    The patient's RUQ pain is controlled with pain medications.  She denies nausea, vomiting, or bleeding.    Review of Systems:  ROS    Physical Exam:  Physical Exam   Constitutional: She is oriented to person, place, and time.   Chronically ill, jaundiced appearing woman.  She drifts off to sleep during my interview.   Cardiovascular: Normal rate and regular rhythm.    Pulmonary/Chest: No respiratory  distress. She has no wheezes. She has no rales.   Abdominal: Soft. Bowel sounds are normal. She exhibits distension (Mild in RUQ where hepatomegaly is evident.  The liver edge is firm, located 6 cm below costal margin.  Spleen isn't palpable.). There is no rebound and no guarding.   Neurological: She is oriented to person, place, and time.       Labs:        Invalid input(s): [id number]      Recent Labs      10/23/17   0452   SODIUM  132*   POTASSIUM  3.9   CHLORIDE  95*   CO2  22   BUN  41*   CREATININE  1.32   CALCIUM  8.6     Recent Labs      10/23/17   0452   ALTSGPT  190*   ASTSGOT  750*   ALKPHOSPHAT  558*   TBILIRUBIN  7.9*   GLUCOSE  102*     Recent Labs      10/22/17   1208  10/23/17   0452   RBC  3.99*  3.93*   HEMOGLOBIN  9.4*  9.3*   HEMATOCRIT  31.1*  30.6*   PLATELETCT  264  226   PROTHROMBTM   --   18.1*   INR   --   1.52*   IRON  22*   --    TOTIRONBC  339   --      Recent Labs      10/22/17   1208  10/23/17   0452   WBC  20.5*  16.6*   NEUTSPOLYS   --   83.20*   LYMPHOCYTES   --   5.00*   MONOCYTES   --   7.70   EOSINOPHILS   --   0.50   BASOPHILS   --   0.30   ASTSGOT   --   750*   ALTSGPT   --   190*   ALKPHOSPHAT   --   558*   TBILIRUBIN   --   7.9*     Hemodynamics:  Temp (24hrs), Av.4 °C (97.6 °F), Min:36.1 °C (97 °F), Max:36.9 °C (98.4 °F)  Temperature: 36.6 °C (97.9 °F)  Pulse  Av.3  Min: 85  Max: 108Heart Rate (Monitored): 92  Blood Pressure : 100/58, NIBP: (!) 97/61     Respiratory:    Respiration: 18, Pulse Oximetry: 94 %, O2 Daily Delivery Respiratory : Nasal Cannula     Work Of Breathing / Effort: Mild  RUL Breath Sounds: Clear, RML Breath Sounds: Clear, RLL Breath Sounds: Diminished, JULISSA Breath Sounds: Clear, LLL Breath Sounds: Diminished  Fluids:  No intake or output data in the 24 hours ending 10/24/17 1117     GI/Nutrition:  Orders Placed This Encounter   Procedures   • DIET ORDER     Standing Status:   Standing     Number of Occurrences:   1     Order Specific Question:    Diet:     Answer:   Low Fiber(GI Soft) [2]     Medical Decision Making, by Problem:  Active Hospital Problems    Diagnosis   • Acute and chronic respiratory failure with hypoxia (CMS-HCC) [J96.21]   • Acute deep vein thrombosis (DVT) of femoral vein of left lower extremity (CMS-HCC) [I82.412]   • Colon cancer metastasized to liver (CMS-HCC) [C18.9, C78.7]   • Colon cancer metastasized to lung (CMS-HCC) [C18.9, C78.00]   • Pulmonary embolism (CMS-HCC) [I26.99]     1. Stage 4 cancer (likely colon primary) with carcinomatosis, liver lesions, lung lesions and lymphadenopathy.  CEA 1586; AFP normal.  Pathology pending.  2. Jaundice and abnormal liver enzymes due to metastatic disease of the liver.  3. Acute left femoral DVT with pulmonary emboli.  On Lovenox.  4. Iron deficiency anemia.  5. Obesity.    Plan:  - Okay to discharge with home health if necessary (needs walker, bedside commode, etc.).  - Suggest involvement of [facility] Nurse Coordinator.  - Oncology consultation.  - I will present her case at Tumor Board 1st Wed of November.      Reviewed items::  Labs reviewed and Medications reviewed

## 2017-10-24 NOTE — CARE PLAN
Problem: Mobility  Goal: Risk for activity intolerance will decrease    Intervention: Assess and monitor signs of activity intolerance  Patient assessed for mobility, pt assisted to use commode, standing and will encourage ambulating on the hallways today.

## 2017-10-24 NOTE — CARE PLAN
Problem: Communication  Goal: The ability to communicate needs accurately and effectively will improve    Intervention: Beulah patient and significant other/support system to call light to alert staff of needs  Patient and family educated by MD about the new diagnosis (cancer) and about what the new plan of care will be.

## 2017-10-24 NOTE — DISCHARGE PLANNING
ALEXEY Alcala was approached by pt's  and asked to cancel RX to [facility] pharmacy and he will bring the paper RX to [facility] on [address].  IVA Kerr will change pt's pharmacy in the computer.  ALEXEY called [facility] at [phone] and spoke to Bert lisa requested this RX be cancelled and cleared from insurance so they can bring to another pharmacy.

## 2017-10-24 NOTE — DISCHARGE PLANNING
Medical Social Work    Referral: ALEXEY reviewed the chart this AM.      Intervention: Per flowsheet, pt lives with spouse and expects to d.c home.  Pt is currently on 2L O2 but no home O2 noted.  PT will follow up to evaluate.  No SS or DC needs at this time.      Plan: ALEXEY Adamsy available to assist with any d.c planning.

## 2017-10-24 NOTE — PROGRESS NOTES
Bedside report given to Usha ENRIQUE. Plan of care discussed. Pt resting in bed with safety precautions in place.

## 2017-10-24 NOTE — PROGRESS NOTES
Assessment completed. See flowsheet. Meds given per MAR. Pt resting in bed with safety precautions in place.

## 2017-10-24 NOTE — CARE PLAN
Problem: Discharge Barriers/Planning  Goal: Patient's continuum of care needs will be met    Intervention: Assess potential discharge barriers on admission and throughout hospital stay  Patient oriented and assessed for possible discharge today.

## 2017-10-24 NOTE — PROGRESS NOTES
Shift report received from Jimmy (IVA), plan of care received, patient awake and aware of colonoscopy at 11am.

## 2017-10-24 NOTE — DISCHARGE PLANNING
ALEXEY Alcala called [facility] on [facility] [phone] and was told that they only have 1 insurance on file.  ALEXEY called pt's room and spoke to pt and pt's  who argued with ALEXEY stating they have Medicare and BC/BS Federal Employee benefits.  ALEXEY explained that if they don't have Medicare part D then they do not have insurance coverage for prescriptions and only have coverage through [place] BC/BS and the cost is $200.  ALEXEY asked him if they could afford it and pt's  stated fine and hung up.  ALEXEY informed Dr. Ferguson.

## 2017-10-24 NOTE — CARE PLAN
Problem: Venous Thromboembolism (VTW)/Deep Vein Thrombosis (DVT) Prevention:  Goal: Patient will participate in Venous Thrombosis (VTE)/Deep Vein Thrombosis (DVT)Prevention Measures    Intervention: Assess and monitor for anticoagulation complications  Patient assessed for progress of current PE or possible new DVT, pateitn educated about s/s of DVT and PE.

## 2017-10-24 NOTE — PROGRESS NOTES
Bedside report received from Usha ENRIQUE. Plan of care discussed. Pt resting in bed with safety precautions in place.

## 2017-10-24 NOTE — THERAPY
Occupational Therapy- Attempted OT eval, pt seated EOB dressed and planning for d/c.  Per RN and SW, Family (spouse) has decided to get a FWW and bedside commode for pt from [facility].  Will defer eval as pt has been d/c already per MD.

## 2017-10-24 NOTE — PROGRESS NOTES
Dr. Schwartz explained patient and family part of the results from colonoscopy, no further questions, patient stable.

## 2017-10-24 NOTE — CARE PLAN
Problem: Safety  Goal: Will remain free from injury  Outcome: PROGRESSING AS EXPECTED  Pt encouraged to call for assistance as needed. Safety precautions in place. Regular rounding.  Intervention: Provide assistance with mobility  Pt encouraged to call for assistance as needed. Safety precautions in place. Regular rounding.      Problem: Discharge Barriers/Planning  Goal: Patient's continuum of care needs will be met  Outcome: PROGRESSING AS EXPECTED  Discharge plan discussed with pt. Pt's questions answered.

## 2017-10-24 NOTE — DISCHARGE INSTRUCTIONS
Discharge Instructions    Discharged to home by car with relative. Discharged via wheelchair, hospital escort: Yes.  Special equipment needed: Not Applicable    Be sure to schedule a follow-up appointment with your primary care doctor or any specialists as instructed.     Discharge Plan:   Diet Plan: Discussed  Activity Level: Discussed  Confirmed Follow up Appointment: Appointment Scheduled  Confirmed Symptoms Management: Discussed  Medication Reconciliation Updated: Yes  Influenza Vaccine Indication: Not indicated: Previously immunized this influenza season and > 8 years of age    I understand that a diet low in cholesterol, fat, and sodium is recommended for good health. Unless I have been given specific instructions below for another diet, I accept this instruction as my diet prescription.   Other diet: low fiber    Special Instructions: None    · Is patient discharged on Warfarin / Coumadin?   No     · Is patient Post Blood Transfusion?  No      Pulmonary Embolism  A pulmonary (lung) embolism (PE) is a blood clot that has traveled to the lung and results in a blockage of blood flow in the affected lung. Most clots come from deep veins in the legs or pelvis. PE is a dangerous and potentially life-threatening condition that can be treated if identified.  CAUSES  Blood clots form in a vein for different reasons. Usually several things cause blood clots. They include:  · The flow of blood slows down.  · The inside of the vein is damaged in some way.  · The person has a condition that makes the blood clot more easily.  RISK FACTORS  Some people are more likely than others to develop PE. Risk factors include:   · Smoking.  · Being overweight (obese).  · Sitting or lying still for a long time. This includes long-distance travel, paralysis, or recovery from an illness or surgery.  Other factors that increase risk are:   · Older age, especially over 75 years of age.  · Having a family history of blood clots or if you  have already had a blood clot.  · Having major or lengthy surgery. This is especially true for surgery on the hip, knee, or belly (abdomen). Hip surgery is particularly high risk.  · Having a long, thin tube (catheter) placed inside a vein during a medical procedure.  · Breaking a hip or leg.  · Having cancer or cancer treatment.  · Medicines containing the female hormone estrogen. This includes birth control pills and hormone replacement therapy.  · Other circulation or heart problems.  · Pregnancy and childbirth.  ¨ Hormone changes make the blood clot more easily during pregnancy.  ¨ The fetus puts pressure on the veins of the pelvis.  ¨ There is a risk of injury to veins during delivery or a caesarean delivery. The risk is highest just after childbirth.    PREVENTION   · Exercise the legs regularly. Take a brisk 30 minute walk every day.  · Maintain a weight that is appropriate for your height.  · Avoid sitting or lying in bed for long periods of time without moving your legs.  · Women, particularly those over the age of 35 years, should consider the risks and benefits of taking estrogen medicines, including birth control pills.  · Do not smoke, especially if you take estrogen medicines.  · Long-distance travel can increase your risk. You should exercise your legs by walking or pumping the muscles every hour.  · Many of the risk factors above relate to situations that exist with hospitalization, either for illness, injury, or elective surgery. Prevention may include medical and nonmedical measures.    ¨ Your health care provider will assess you for the need for venous thromboembolism prevention when you are admitted to the hospital. If you are having surgery, your surgeon will assess you the day of or day after surgery.     SYMPTOMS   The symptoms of a PE usually start suddenly and include:  · Shortness of breath.  · Coughing.  · Coughing up blood or blood-tinged mucus.  · Chest pain. Pain is often worse with  deep breaths.  · Rapid heartbeat.  DIAGNOSIS   Your health care provider will take a medical history, perform a physical exam, and use rule-out criteria to assess your risk for PE. If your risk is intermediate or high, other tests may be done. These include:  · Blood tests, such as studies of the clotting properties of your blood.  · Imaging tests, such as ultrasound, CT, MRI, and other tests to see if you have clots in your legs or lungs.  · An electrocardiogram. This can look for heart strain from blood clots in the lungs.  TREATMENT   · The most common treatment for a PE is blood thinning (anticoagulant) medicine, which reduces the blood's tendency to clot. Anticoagulants can stop new blood clots from forming and old clots from growing. They cannot dissolve existing clots. Your body does this by itself over time. Anticoagulants can be given by mouth, through an intravenous (IV) tube, or by injection. Your health care provider will determine the best program for you.  · Less commonly, clot-dissolving medicines (thrombolytics) are used to dissolve a PE. They carry a high risk of bleeding, so they are used mainly in severe cases.  · Very rarely, a blood clot in the leg needs to be removed surgically.  · If you are unable to take anticoagulants, your health care provider may arrange for you to have a filter placed in a main vein in your abdomen. This filter prevents clots from traveling to your lungs.  HOME CARE INSTRUCTIONS   · Take all medicines as directed by your health care provider.  · Learn as much as you can about DVT.  · Wear a medical alert bracelet or carry a medical alert card.  · Ask your health care provider how soon you can go back to normal activities. It is important to stay active to prevent blood clots. If you are on anticoagulant medicine, avoid contact sports.  · It is very important to exercise. This is especially important while traveling, sitting, or standing for long periods of time. Exercise  your legs by walking or by tightening and relaxing your leg muscles regularly. Take frequent walks.  · You may need to wear compression stockings. These are tight elastic stockings that apply pressure to the lower legs. This pressure can help keep the blood in the legs from clotting.  Taking Warfarin  Warfarin is a daily medicine that is taken by mouth. Your health care provider will advise you on the length of treatment (usually 3-6 months, sometimes lifelong). If you take warfarin:  · Understand how to take warfarin and foods that can affect how warfarin works in your body.  · Too much and too little warfarin are both dangerous. Too much warfarin increases the risk of bleeding. Too little warfarin continues to allow the risk for blood clots.  Warfarin and Regular Blood Testing  While taking warfarin, you will need to have regular blood tests to measure your blood clotting time. These blood tests usually include both the prothrombin time (PT) and international normalized ratio (INR) tests. The PT and INR results allow your health care provider to adjust your dose of warfarin. It is very important that you have your PT and INR tested as often as directed by your health care provider.   Warfarin and Your Diet  Avoid major changes in your diet, or notify your health care provider before changing your diet. Arrange a visit with a registered dietitian to answer your questions. Many foods, especially foods high in vitamin K, can interfere with warfarin and affect the PT and INR results. You should eat a consistent amount of foods high in vitamin K. Foods high in vitamin K include:   · Spinach, kale, broccoli, cabbage, sury and turnip greens, [place] sprouts, peas, cauliflower, seaweed, and parsley.  · Beef and pork liver.  · Green tea.  · Soybean oil.  Warfarin with Other Medicines  Many medicines can interfere with warfarin and affect the PT and INR results. You must:  · Tell your health care provider about any and  all medicines, vitamins, and supplements you take, including aspirin and other over-the-counter anti-inflammatory medicines. Be especially cautious with aspirin and anti-inflammatory medicines. Ask your health care provider before taking these.  · Do not take or discontinue any prescribed or over-the-counter medicine except on the advice of your health care provider or pharmacist.  Warfarin Side Effects  Warfarin can have side effects, such as easy bruising and difficulty stopping bleeding. Ask your health care provider or pharmacist about other side effects of warfarin. You will need to:  · Hold pressure over cuts for longer than usual.  · Notify your dentist and other health care providers that you are taking warfarin before you undergo any procedures where bleeding may occur.  Warfarin with Alcohol and Tobacco   · Drinking alcohol frequently can increase the effect of warfarin, leading to excess bleeding. It is best to avoid alcoholic drinks or consume only very small amounts while taking warfarin. Notify your health care provider if you change your alcohol intake.  · Do not use any tobacco products including cigarettes, chewing tobacco, or electronic cigarettes. If you smoke, quit. Ask your health care provider for help with quitting smoking.  Alternative Medicines to Warfarin: Factor Xa Inhibitor Medicines  · These blood thinning medicines are taken by mouth, usually for several weeks or longer. It is important to take the medicine every single day, at the same time each day.  · There are no regular blood tests required when using these medicines.  · There are fewer food and drug interactions than with warfarin.  · The side effects of this class of medicine is similar to that of warfarin, including excessive bruising or bleeding. Ask your health care provider or pharmacist about other potential side effects.  SEEK MEDICAL CARE IF:   · You notice a rapid heartbeat.  · You feel weaker or more tired than  usual.  · You feel faint.  · You notice increased bruising.  · Your symptoms are not getting better in the time expected.  · You are having side effects of medicine.  SEEK IMMEDIATE MEDICAL CARE IF:   · You have chest pain.  · You have trouble breathing.  · You have new or increased swelling or pain in one leg.  · You cough up blood.  · You notice blood in vomit, in a bowel movement, or in urine.  · You have a fever.  Symptoms of PE may represent a serious problem that is an emergency. Do not wait to see if the symptoms will go away. Get medical help right away. Call your local emergency services (911 in the [place]). Do not drive yourself to the hospital.       This information is not intended to replace advice given to you by your health care provider. Make sure you discuss any questions you have with your health care provider.     Document Released: 12/15/2001 Document Revised: 01/08/2016 Document Reviewed: 04/13/2016  [employer] Interactive Patient Education ©2016 [employer]    Metastatic Cancer  Metastatic cancer is cancer that has spread from the place where it started (primary site) to another part of the body. The process of cancer spreading from the primary site is called metastasis. When cancer cells metastasize, they do not change the way they look or the way they affect the body. Primary lung cancer that spreads to the brain is metastatic lung cancer, not brain cancer. Cancer cells can spread:  · Directly from one part of the body to a nearby area (local invasion).  · Into a lymph vessel and be carried through the lymph system to lymph nodes and other parts of the body. The lymph system is a network of vessels and nodes that carry fluid throughout the body and help to protect against infections.  · Into the blood vessels and be carried to other parts of the body through the bloodstream.  WHAT TYPES OF CANCER CAN SPREAD?   All types of cancer can spread. Some cancers are more likely to metastasize  than others. The most common places that cancers metastasize to are:  · Bones.  · Liver.  · Lungs.  Cancers that are more advanced when they are diagnosed and treated are more likely to metastasize. Some primary cancers are more likely to metastasize to a specific part of the body. For example:  · Breast cancer may spread to:  ¨ Bones.  ¨ Brain.  ¨ Liver.  ¨ Lungs.  · Lung cancer may spread to:  ¨ Bones.  ¨ Brain.  ¨ Liver.  ¨ Adrenal gland.  ¨ Other lung.  · Melanoma skin cancer may spread to:  ¨ Bones.  ¨ Brain.  ¨ Liver.  ¨ Lungs.  ¨ Muscles.  · Prostate cancer may spread to:  ¨ Bones.  ¨ Liver.  ¨ Lungs.  ¨ Adrenal gland.  · Colon cancer may spread to:  ¨ Tissue that lines the abdominal wall and covers most of the abdominal organs.  ¨ Liver.  ¨ Lungs.  WHAT ARE THE RISKS FOR METASTATIC CANCER?  Your risk for metastatic cancer depends on:  · The type of cancer that you have.  · The stage and grade of your primary cancer at the time of diagnosis.  Tumors are graded by looking at tumor cells under a microscope. Grading predicts how quickly the tumor cells will grow. Your health care provider will use the stage and the grade of your primary cancer to determine the chances of metastasis. This helps your health care provider to find the best treatment for you.  Risk for metastasis may go up with:  · A larger primary tumor.  · A higher grade of tumor.  · Deeper growth of tumor.  · Lymph node involvement.  HOW IS METASTATIC CANCER DIAGNOSED?  Your health care provider may suspect metastatic cancer from your signs and symptoms. Some people do not have any symptoms. Their cancer is found through imaging or other tests.  · Symptoms may include:  ¨ Weakness.  ¨ Lack of energy.  ¨ Pain.  ¨ Weight loss.  ¨ Trouble breathing.  · Signs may include:  ¨ Fluid buildup in your lungs or belly.  ¨ Tumor growths that can be felt or seen.  ¨ An enlarged liver.  Your health care provider will also do a physical exam. This may  include:  · Blood tests to check for certain substances that are secreted by tumors (tumor markers).  ¨ Tumor markers that increase after treatment can indicate metastasis.  ¨ Tumor markers may be used to help diagnose metastasis in colon and prostate cancer.  ¨ Not all cancers have tumor markers.  · Imaging studies, such as:  ¨ X-rays.  ¨ Ultrasound.  ¨ MRI.  ¨ Other imaging tests, such as CT scans, bone scans, and PET scans.  · Biopsy.  ¨ This involves checking a small piece of tissue from a new cancer site to see if the cells are similar to cancer cells from the primary site. This can confirm metastatic cancer.  ¨ Biopsies may be done by surgically removing a piece of tissue or using a needle to get a tissue sample.  · Testing fluid samples from the lungs, spine, or belly for metastatic cancer cells.  WHAT ARE THE TREATMENT OPTIONS FOR METASTATIC CANCER?  There are many options for treating metastatic cancer. Your treatment will depend on:  · The type of cancer that you have.  · How far your cancer has advanced.  · Your general health.  Treatment may not be able to cure metastatic cancer, but it can often relieve the symptoms. In many cases, you may have a combination of treatments. Options may include:  · Surgery.  · Cancer-killing drugs (chemotherapy).  · X-ray treatment (radiation therapy).  · Hormone therapy.  · Treatments that help your body to fight cancer (biologic therapy).  CAN METASTATIC CANCER BE PREVENTED?  The only way to prevent metastatic cancer is to find your primary cancer early and treat it successfully. Talk with your health care provider about cancer screening. Screening exams for early detection are available for some types of cancer, including:  · Breast.  · Colon.  · Prostate.  · Lung.  · Cervical.  HOW CAN I LEARN MORE?  The following websites provide more information.  · National Cancer [place] (NCI) http://www.cancer.gov/cancertopics/what-is-cancer/metastatic-fact-sheet  · American  Cancer Society (ACS) http://www.cancer.org/treatment/understandingyourdiagnosis/advancedcancer/advanced-cancer-what-is-metastatic     This information is not intended to replace advice given to you by your health care provider. Make sure you discuss any questions you have with your health care provider.     Document Released: 04/24/2006 Document Revised: 01/08/2016 Document Reviewed: 04/02/2015  [employer] Interactive Patient Education ©2016 [employer]    Enoxaparin injection  What is this medicine?  ENOXAPARIN (ee nox a PA rin) is used after knee, hip, or abdominal surgeries to prevent blood clotting. It is also used to treat existing blood clots in the lungs or in the veins.  This medicine may be used for other purposes; ask your health care provider or pharmacist if you have questions.  COMMON BRAND NAME(S): Lovenox  What should I tell my health care provider before I take this medicine?  They need to know if you have any of these conditions:  -bleeding disorders, hemorrhage, or hemophilia  -infection of the heart or heart valves  -kidney or liver disease  -previous stroke  -prosthetic heart valve  -recent surgery or delivery of a baby  -ulcer in the stomach or intestine, diverticulitis, or other bowel disease  -an unusual or allergic reaction to enoxaparin, heparin, pork or pork products, other medicines, foods, dyes, or preservatives  -pregnant or trying to get pregnant  -breast-feeding  How should I use this medicine?  This medicine is for injection under the skin. It is usually given by a health-care professional. You or a family member may be trained on how to give the injections. If you are to give yourself injections, make sure you understand how to use the syringe, measure the dose if necessary, and give the injection. To avoid bruising, do not rub the site where this medicine has been injected. Do not take your medicine more often than directed. Do not stop taking except on the advice of your doctor or  health care professional.  Make sure you receive a puncture-resistant container to dispose of the needles and syringes once you have finished with them. Do not reuse these items. Return the container to your doctor or health care professional for proper disposal.  Talk to your pediatrician regarding the use of this medicine in children. Special care may be needed.  Overdosage: If you think you have taken too much of this medicine contact a poison control center or emergency room at once.  NOTE: This medicine is only for you. Do not share this medicine with others.  What if I miss a dose?  If you miss a dose, take it as soon as you can. If it is almost time for your next dose, take only that dose. Do not take double or extra doses.  What may interact with this medicine?  Do not take this medicine with any of the following medications:  -aspirin and aspirin-like medicines  -heparin  -mifepristone  -palifermin  -warfarin   This medicine may also interact with the following medications:  -cilostazol  -clopidogrel  -dipyridamole  -NSAIDs, medicines for pain and inflammation, like ibuprofen or naproxen  -sulfinpyrazone  -ticlopidine  This list may not describe all possible interactions. Give your health care provider a list of all the medicines, herbs, non-prescription drugs, or dietary supplements you use. Also tell them if you smoke, drink alcohol, or use illegal drugs. Some items may interact with your medicine.  What should I watch for while using this medicine?  Visit your doctor or health care professional for regular checks on your progress. Your condition will be monitored carefully while you are receiving this medicine.  If you are going to have surgery, tell your doctor or health care professional that you are taking this medicine.  Do not stop taking this medicine without first talking to your doctor. Be sure to refill your prescription before you run out of medicine.  Avoid sports and activities that might  cause injury while you are using this medicine. Severe falls or injuries can cause unseen bleeding. Be careful when using sharp tools or knives. Consider using an electric razor. Take special care brushing or flossing your teeth. Report any injuries, bruising, or red spots on the skin to your doctor or health care professional.  What side effects may I notice from receiving this medicine?  Side effects that you should report to your doctor or health care professional as soon as possible:  -allergic reactions like skin rash, itching or hives, swelling of the face, lips, or tongue  -dark urine  -feeling faint or lightheaded, falls  -fever  -heavy menstrual bleeding  -signs and symptoms of bleeding such as bloody or black, tarry stools; red or dark-brown urine; spitting up blood or brown material that looks like coffee grounds; red spots on the skin; unusual bruising or bleeding from the eye, gums, or nose  -signs and symptoms of a blood clot such as breathing problems; changes in vision; chest pain; severe, sudden headache; pain, swelling, warmth in the leg; trouble speaking; sudden numbness or weakness of the face, arm or leg  Side effects that usually do not require medical attention (report to your doctor or health care professional if they continue or are bothersome):  -pain or irritation at the injection site  This list may not describe all possible side effects. Call your doctor for medical advice about side effects. You may report side effects to FDA at 1-729-FDA-0758.  Where should I keep my medicine?  Keep out of the reach of children.  Store at room temperature between 15 and 30 degrees C (59 and 86 degrees F). Do not freeze. If your injections have been specially prepared, you may need to store them in the refrigerator. Ask your pharmacist. Throw away any unused medicine after the expiration date.  NOTE: This sheet is a summary. It may not cover all possible information. If you have questions about this  medicine, talk to your doctor, pharmacist, or health care provider.  © 2014, [employer]. (3/31/2014 10:04:27 AM)  Tramadol tablets  What is this medicine?  TRAMADOL (TRA ma dole) is a pain reliever. It is used to treat moderate to severe pain in adults.  This medicine may be used for other purposes; ask your health care provider or pharmacist if you have questions.  COMMON BRAND NAME(S): Ultram  What should I tell my health care provider before I take this medicine?  They need to know if you have any of these conditions:  -brain tumor  -depression  -drug abuse or addiction  -head injury  -if you frequently drink alcohol containing drinks  -kidney disease or trouble passing urine  -liver disease  -lung disease, asthma, or breathing problems  -seizures or epilepsy  -suicidal thoughts, plans, or attempt; a previous suicide attempt by you or a family member  -an unusual or allergic reaction to tramadol, codeine, other medicines, foods, dyes, or preservatives  -pregnant or trying to get pregnant  -breast-feeding  How should I use this medicine?  Take this medicine by mouth with a full glass of water. Follow the directions on the prescription label. If the medicine upsets your stomach, take it with food or milk. Do not take more medicine than you are told to take.  Talk to your pediatrician regarding the use of this medicine in children. Special care may be needed.  Overdosage: If you think you have taken too much of this medicine contact a poison control center or emergency room at once.  NOTE: This medicine is only for you. Do not share this medicine with others.  What if I miss a dose?  If you miss a dose, take it as soon as you can. If it is almost time for your next dose, take only that dose. Do not take double or extra doses.  What may interact with this medicine?  Do not take this medicine with any of the following medications:  -MAOIs like Carbex, Eldepryl, Marplan, Nardil, and Parnate  This medicine  may also interact with the following medications:  -alcohol or medicines that contain alcohol  -antihistamines  -benzodiazepines  -bupropion  -carbamazepine or oxcarbazepine  -clozapine  -cyclobenzaprine  -digoxin  -furazolidone  -linezolid  -medicines for depression, anxiety, or psychotic disturbances  -medicines for migraine headache like almotriptan, eletriptan, frovatriptan, naratriptan, rizatriptan, sumatriptan, zolmitriptan  -medicines for pain like pentazocine, buprenorphine, butorphanol, meperidine, nalbuphine, and propoxyphene  -medicines for sleep  -muscle relaxants  -naltrexone  -phenobarbital  -phenothiazines like perphenazine, thioridazine, chlorpromazine, mesoridazine, fluphenazine, prochlorperazine, promazine, and trifluoperazine  -procarbazine  -warfarin  This list may not describe all possible interactions. Give your health care provider a list of all the medicines, herbs, non-prescription drugs, or dietary supplements you use. Also tell them if you smoke, drink alcohol, or use illegal drugs. Some items may interact with your medicine.  What should I watch for while using this medicine?  Tell your doctor or health care professional if your pain does not go away, if it gets worse, or if you have new or a different type of pain. You may develop tolerance to the medicine. Tolerance means that you will need a higher dose of the medicine for pain relief. Tolerance is normal and is expected if you take this medicine for a long time.  Do not suddenly stop taking your medicine because you may develop a severe reaction. Your body becomes used to the medicine. This does NOT mean you are addicted. Addiction is a behavior related to getting and using a drug for a non-medical reason. If you have pain, you have a medical reason to take pain medicine. Your doctor will tell you how much medicine to take. If your doctor wants you to stop the medicine, the dose will be slowly lowered over time to avoid any side  effects.  You may get drowsy or dizzy. Do not drive, use machinery, or do anything that needs mental alertness until you know how this medicine affects you. Do not stand or sit up quickly, especially if you are an older patient. This reduces the risk of dizzy or fainting spells. Alcohol can increase or decrease the effects of this medicine. Avoid alcoholic drinks.  You may have constipation. Try to have a bowel movement at least every 2 to 3 days. If you do not have a bowel movement for 3 days, call your doctor or health care professional.  Your mouth may get dry. Chewing sugarless gum or sucking hard candy, and drinking plenty of water may help. Contact your doctor if the problem does not go away or is severe.  What side effects may I notice from receiving this medicine?  Side effects that you should report to your doctor or health care professional as soon as possible:  -allergic reactions like skin rash, itching or hives, swelling of the face, lips, or tongue  -breathing difficulties, wheezing  -confusion  -itching  -light headedness or fainting spells  -redness, blistering, peeling or loosening of the skin, including inside the mouth  -seizures  Side effects that usually do not require medical attention (report to your doctor or health care professional if they continue or are bothersome):  -constipation  -dizziness  -drowsiness  -headache  -nausea, vomiting  This list may not describe all possible side effects. Call your doctor for medical advice about side effects. You may report side effects to FDA at 4-661-FDA-7276.  Where should I keep my medicine?  Keep out of the reach of children.  Store at room temperature between 15 and 30 degrees C (59 and 86 degrees F). Keep container tightly closed. Throw away any unused medicine after the expiration date.  NOTE: This sheet is a summary. It may not cover all possible information. If you have questions about this medicine, talk to your doctor, pharmacist, or health  care provider.  © 2014, Elsevier/Gold Standard. (8/31/2011 11:55:44 AM)    Iron tablets, capsules, extended-release tablets  What is this medicine?  IRON (AHY abelardo) replaces iron that is essential to healthy red blood cells. Iron is used to treat iron deficiency anemia. Anemia may cause problems like tiredness, shortness of breath, or slowed growth in children. Only take iron if your doctor has told you to. Do not treat yourself with iron if you are feeling tired. Most healthy people get enough iron in their diets, particularly if they eat cereals, meat, poultry, and fish.  This medicine may be used for other purposes; ask your health care provider or pharmacist if you have questions.  COMMON BRAND NAME(S): Bifera, Duofer, Feosol Complete, Feosol Natural Release, Feosol, Feratab, Ferate , Fergon, Fero-Grad 500, Ferretts, Ferrimin , Sanket-Sequels, Hemocyte, Nephro-Moses, NovaFerrum, ProFe, Slow Fe, Slow Iron , Tandem  What should I tell my health care provider before I take this medicine?  They need to know if you have any of these conditions:  -frequently drink alcohol  -bowel disease  -hemolytic anemia  -iron overload (hemochromatosis, hemosiderosis)  -liver disease  -problems with swallowing  -stomach ulcer or other stomach problems  -an unusual or allergic reaction to iron, other medicines, foods, dyes, or preservatives  -pregnant or trying to get pregnant  -breast-feeding  How should I use this medicine?  Take this medicine by mouth with a glass of water or fruit juice. Follow the directions on the prescription label. Swallow whole. Do not crush or chew. Take this medicine in an upright or sitting position. Try to take any bedtime doses at least 10 minutes before lying down. You may take this medicine with food. Take your medicine at regular intervals. Do not take your medicine more often than directed. Do not stop taking except on your doctor's advice.  Talk to your pediatrician regarding the use of this medicine  in children. While this drug may be prescribed for selected conditions, precautions do apply.  Overdosage: If you think you have taken too much of this medicine contact a poison control center or emergency room at once.  NOTE: This medicine is only for you. Do not share this medicine with others.  What if I miss a dose?  If you miss a dose, take it as soon as you can. If it is almost time for your next dose, take only that dose. Do not take double or extra doses.  What may interact with this medicine?  If you are taking this iron product, you should not take iron in any other medicine or dietary supplement.  This medicine may also interact with the following medications:  -alendronate  -antacids  -cefdinir  -chloramphenicol  -cholestyramine  -deferoxamine  -dimercaprol  -etidronate  -medicines for stomach ulcers or other stomach problems  -pancreatic enzymes  -quinolone antibiotics (examples: Cipro, Floxin, Levaquin, Tequin and others)  -risedronate  -tetracycline antibiotics (examples: doxycycline, tetracycline, minocycline, and others)  -thyroid hormones  This list may not describe all possible interactions. Give your health care provider a list of all the medicines, herbs, non-prescription drugs, or dietary supplements you use. Also tell them if you smoke, drink alcohol, or use illegal drugs. Some items may interact with your medicine.  What should I watch for while using this medicine?  Use iron supplements only as directed by your health care professional. You will need important blood work while you are taking this medicine. It may take 3 to 6 months of therapy to treat low iron levels. Pregnant women should follow the dose and length of iron treatment as directed by their doctors.  Do not use iron longer than prescribed, and do not take a higher dose than recommended. Long-term use may cause excess iron to build-up in the body.  Do not take iron with antacids. If you need to take an antacid, take it 2 hours  after a dose of iron.  What side effects may I notice from receiving this medicine?  Side effects that you should report to your doctor or health care professional as soon as possible:  -allergic reactions like skin rash, itching or hives, swelling of the face, lips, or tongue  -blue lips, nails, or palms  -dark colored stools (this may be due to the iron, but can indicate a more serious condition)  -drowsiness  -pain with or difficulty swallowing  -pale or clammy skin  -seizures  -stomach pain  -unusually weak or tired  -vomiting  -weak, fast, or irregular heartbeat  Side effects that usually do not require medical attention (report to your doctor or health care professional if they continue or are bothersome):  -constipation  -indigestion  -nausea or stomach upset  This list may not describe all possible side effects. Call your doctor for medical advice about side effects. You may report side effects to FDA at 2-881-FDA-7119.  Where should I keep my medicine?  Keep out of the reach of children. Even small amounts of iron can be harmful to a child.  Store at room temperature between 15 and 30 degrees C (59 and 86 degrees F). Keep container tightly closed. Throw away any unused medicine after the expiration date.  NOTE: This sheet is a summary. It may not cover all possible information. If you have questions about this medicine, talk to your doctor, pharmacist, or health care provider.  © 2014, Elsevier/Gold Standard. (5/5/2009 5:03:41 PM)     Depression / Suicide Risk    As you are discharged from this [facility] facility, it is important to learn how to keep safe from harming yourself.    Recognize the warning signs:  · Abrupt changes in personality, positive or negative- including increase in energy   · Giving away possessions  · Change in eating patterns- significant weight changes-  positive or negative  · Change in sleeping patterns- unable to sleep or sleeping all the time   · Unwillingness or inability to  communicate  · Depression  · Unusual sadness, discouragement and loneliness  · Talk of wanting to die  · Neglect of personal appearance   · Rebelliousness- reckless behavior  · Withdrawal from people/activities they love  · Confusion- inability to concentrate     If you or a loved one observes any of these behaviors or has concerns about self-harm, here's what you can do:  · Talk about it- your feelings and reasons for harming yourself  · Remove any means that you might use to hurt yourself (examples: pills, rope, extension cords, firearm)  · Get professional help from the community (Mental Health, Substance Abuse, psychological counseling)  · Do not be alone:Call your Safe Contact- someone whom you trust who will be there for you.  · Call your local CRISIS HOTLINE 673-2775 or 365-267-5580  · Call your local Children's Mobile Crisis Response Team [place] (231) 059-5600 or www.[employer]  · Call the toll free National Suicide Prevention Hotlines   · National Suicide Prevention Lifeline 766-977-NMKN (6746)  · National Hope Line Network 800-SUICIDE (886-4052)

## 2017-10-25 PROBLEM — R74.8 ELEVATED CPK: Status: ACTIVE | Noted: 2017-01-01

## 2017-10-25 PROBLEM — E87.20 LACTIC ACIDOSIS: Status: ACTIVE | Noted: 2017-01-01

## 2017-10-25 PROBLEM — I95.9 HYPOTENSION: Status: ACTIVE | Noted: 2017-01-01

## 2017-10-25 PROBLEM — N17.9 ARF (ACUTE RENAL FAILURE) (HCC): Status: ACTIVE | Noted: 2017-01-01

## 2017-10-25 PROBLEM — A41.9 SEPSIS (HCC): Status: ACTIVE | Noted: 2017-01-01

## 2017-10-25 NOTE — ASSESSMENT & PLAN NOTE
Suspected secondary to sepsis but cannot rule out underlying malignancy on confusion. Treatment is noted for sepsis noted above.

## 2017-10-25 NOTE — ED PROVIDER NOTES
"ED Provider Note    CHIEF COMPLAINT  Chief Complaint   Patient presents with   • Weakness   • Dizziness   • RUQ Pain       HPI  Denia Landry is a 67 y.o. female who presents to the emergency with weakness, dizziness as well as some falls. Patient was recently admitted to [facility]. She presented with abdominal pain. She was identified to have metastatic colon cancer. She also had a pulmonary embolism. She was discharged from the hospital just yesterday. Since going home she has been very weak. She can't walk even more than a couple steps. She had a fall yesterday evening and then again this morning. She denies head injury, chest pain, shortness of breath. She is still dealing with right upper quadrant abdominal pain which is cramping and sharp. It is worse with movement. After her most recent fall she could not get up. Her  could not help her off the ground. She lives on the ground for somewhere around an hour maybe 2 hours. She has not had any vomiting. No diarrhea. Denies dysuria.    Chart is reviewed. Patient indeed diagnosed with metastatic colon cancer and pulmonary embolism. She was on Lovenox. She had a persistent leukocytosis while in the hospital thought to be secondary to malignancy. She was afebrile.    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   • Colon cancer (CMS-HCC)    • Dental disorder     dentures, upper and lowers   • Liver cancer (CMS-HCC)    • Macular degeneration 10/23/2017   • Other specified symptom associated with female genital organs    • Type II or unspecified type diabetes mellitus without mention of complication, uncontrolled     hx of, hasn't taken meds for 5 years       SOCIAL HISTORY  Social " "History   Substance Use Topics   • Smoking status: Never Smoker   • Smokeless tobacco: Never Used   • Alcohol use No       SURGICAL HISTORY  Past Surgical History:   Procedure Laterality Date   • COLONOSCOPY - ENDO  10/23/2017    Procedure: COLONOSCOPY - ENDO;  Surgeon: Talon Amador M.D.;  Location: SURGERY [facility];  Service: Gastroenterology   • EXTENSOR TENDON REPAIR Left 11/10/2016    Procedure: EXTENSOR TENDON REPAIR - RING FINGER;  Surgeon: Philip De La Cruz M.D.;  Location: SURGERY [facility];  Service:    • HYSTEROSCOPY WITH VIDEO OPERATIVE  8/20/2009    Performed by SHERRI ELI at SURGERY [facility]   • DILATION AND CURETTAGE  8/20/2009    Performed by SHERRI ELI at SURGERY SAME DAY [facility] ORS   • VEIN STRIPPING  2004    LT   • TONSILLECTOMY  1968       CURRENT MEDICATIONS  Home Medications     Reviewed by Salina Alva (Pharmacy Tech) on 10/25/17 at 1106  Med List Status: Complete   Medication Last Dose Status   cephALEXin (KEFLEX) 500 MG Cap 10/25/2017 Active   enoxaparin (LOVENOX) 100 MG/ML Solution inj 10/25/2017 Active   furosemide (LASIX) 40 MG Tab 10/25/2017 Active   Multiple Vitamins-Minerals (VISION-EARNESTINE PRESERVE PO) > 2 weeks Active   tramadol (ULTRAM) 50 MG Tab 10/25/2017 Active                ALLERGIES  No Known Allergies    PHYSICAL EXAM  VITAL SIGNS: BP (!) 86/27   Pulse 70   Temp (!) 35.4 °C (95.8 °F)   Resp 20   Ht 1.727 m (5' 8\")   Wt 98.9 kg (218 lb)   SpO2 100%   BMI 33.15 kg/m²    Constitutional: Awake and alert, ill appearing  HENT:  Atraumatic, Normocephalic.Oropharynx moist mucus membranes, Nose normal inspection.   Eyes: Normal inspection, icterus  Neck: Supple  Cardiovascular: Normal heart rate, Normal rhythm.  Symmetric peripheral pulses.   Thorax & Lungs: No respiratory distress, No wheezing, No rales, No rhonchi, No chest tenderness.   Abdomen: Bowel sounds normal, soft, non-distended, tender right upper " quadrant  Skin: feels cold  Back: No tenderness, No CVA tenderness.   Extremities: Bilateral pedal edema worse on the left than on the right with venous stasis changes  Neurologic: She is tired, but awake alert oriented. Moves all extremity symmetrically.      RADIOLOGY/PROCEDURES  US-GALLBLADDER   Final Result      1.  Heterogeneous liver containing multiple solid masses which are suspicious for metastases. These lesions were also identified on the prior CT.      2.  Small amount of ascites is identified.      DX-CHEST-PORTABLE (1 VIEW)   Final Result      No evidence of acute cardiopulmonary process.      DX-CHEST-PORTABLE (1 VIEW)    (Results Pending)      Imaging is interpreted by radiologist    Labs:  Results for orders placed or performed during the hospital encounter of 10/25/17   Lactic acid (lactate)   Result Value Ref Range    Lactic Acid 8.4 (HH) 0.5 - 2.0 mmol/L   CBC WITH DIFFERENTIAL   Result Value Ref Range    WBC 24.4 (H) 4.8 - 10.8 K/uL    RBC 3.67 (L) 4.20 - 5.40 M/uL    Hemoglobin 8.9 (L) 12.0 - 16.0 g/dL    Hematocrit 29.4 (L) 37.0 - 47.0 %    MCV 80.1 (L) 81.4 - 97.8 fL    MCH 24.3 (L) 27.0 - 33.0 pg    MCHC 30.3 (L) 33.6 - 35.0 g/dL    RDW 58.6 (H) 35.9 - 50.0 fL    Platelet Count 210 164 - 446 K/uL    MPV 10.6 9.0 - 12.9 fL    Nucleated RBC 2.10 /100 WBC    NRBC (Absolute) 0.52 K/uL    Neutrophils-Polys 74.00 (H) 44.00 - 72.00 %    Lymphocytes 8.00 (L) 22.00 - 41.00 %    Monocytes 7.00 0.00 - 13.40 %    Eosinophils 0.00 0.00 - 6.90 %    Basophils 0.00 0.00 - 1.80 %    Neutrophils (Absolute) 20.01 (H) 2.00 - 7.15 K/uL    Lymphs (Absolute) 1.95 1.00 - 4.80 K/uL    Monos (Absolute) 1.71 (H) 0.00 - 0.85 K/uL    Eos (Absolute) 0.00 0.00 - 0.51 K/uL    Baso (Absolute) 0.00 0.00 - 0.12 K/uL    Anisocytosis 1+    COMP METABOLIC PANEL   Result Value Ref Range    Sodium 130 (L) 135 - 145 mmol/L    Potassium 5.3 3.6 - 5.5 mmol/L    Chloride 94 (L) 96 - 112 mmol/L    Co2 13 (L) 20 - 33 mmol/L    Anion Gap  23.0 (H) 0.0 - 11.9    Glucose 80 65 - 99 mg/dL    Bun 59 (H) 8 - 22 mg/dL    Creatinine 2.80 (H) 0.50 - 1.40 mg/dL    Calcium 6.8 (LL) 8.5 - 10.5 mg/dL    AST(SGOT) 2843 (HH) 12 - 45 U/L    ALT(SGPT) 706 (H) 2 - 50 U/L    Alkaline Phosphatase 752 (H) 30 - 99 U/L    Total Bilirubin 8.8 (H) 0.1 - 1.5 mg/dL    Albumin 2.3 (L) 3.2 - 4.9 g/dL    Total Protein 5.0 (L) 6.0 - 8.2 g/dL    Globulin 2.7 1.9 - 3.5 g/dL    A-G Ratio 0.9 g/dL   AMMONIA   Result Value Ref Range    Ammonia 93 (H) 11 - 45 umol/L   CREATINE KINASE   Result Value Ref Range    CPK Total 1277 (H) 0 - 154 U/L   LIPASE   Result Value Ref Range    Lipase 48 11 - 82 U/L   DIFFERENTIAL MANUAL   Result Value Ref Range    Bands-Stabs 8.00 0.00 - 10.00 %    Metamyelocytes 2.00 %    Myelocytes 1.00 %    Manual Diff Status PERFORMED    PERIPHERAL SMEAR REVIEW   Result Value Ref Range    Peripheral Smear Review see below    PLATELET ESTIMATE   Result Value Ref Range    Plt Estimation Normal    MORPHOLOGY   Result Value Ref Range    RBC Morphology Present     Polychromia 1+     Poikilocytosis 1+     Echinocytes 1+    ESTIMATED GFR   Result Value Ref Range    GFR If  20 (A) >60 mL/min/1.73 m 2    GFR If Non African American 17 (A) >60 mL/min/1.73 m 2   EKG (ER)   Result Value Ref Range    Report       [facility] Emergency Dept.    Test Date:  2017-10-25  Pt Name:    SHANNAN DREW             Department: ER  MRN:        [id number]                      Room:       RD 01  Gender:     F                            Technician: [id number]  :        [id number]                   Requested By:ER TRIAGE PROTOCOL  Order #:    [id number]                    Reading MD: KHANG DE LA ROSA MD    Measurements  Intervals                                Axis  Rate:       68                           P:          42  LA:         152                          QRS:        -5  QRSD:       82                           T:          41  QT:         452  QTc:         481    Interpretive Statements  SINUS RHYTHM  ATRIAL PREMATURE COMPLEX  Compared to ECG 10/20/2017 18:19:14  Atrial premature complex(es) now present  Sinus tachycardia no longer present    Electronically Signed On 10- 11:44:51 PDT by KHANG DE LA ROSA MD             COURSE & MEDICAL DECISION MAKING  Patient arrives to the ER critically ill. She is hypothermic and hypotensive. IV is established. Her chart was reviewed and summarized above. She is noted to have previous leukocytosis. Recent diagnosis of cancer. Recent diagnosis of PE. IV fluid resuscitation was initiated with 30 mL/kg normal saline bolus. Laboratory data was collected. Empiric Zosyn was given. She is placed on a warmer.    I consulted Dr. Garcia given possibility of intra-abdominal infection and/or cholecystitis based on abnormal laboratory data as well as previous ultrasound demonstrating thickening of the gallbladder. He has evaluated the patient. Please see consultation note. He placed a central venous catheter.    Laboratory data returned as noted above. Remarkable abnormalities including lactic acidosis. The patient is critically ill at this time. Consult to Dr. Aguilar. Vasopressors will be initiated.      FINAL IMPRESSION  1. Septic shock  2. Recent pulmonary embolism  3. Recent diagnosis of metastatic colon cancer  4. Rule out cholecystitis    CRITICAL CARE TIME 30 minutes  There was a very real possibility of deterioration of the patient's condition.  This patient required the highest level of care.  I provided critical care services which included: review of the medical record, treatment orders, ordering and reviewing test results, frequent reevaluation of the patient's condition and response to treatment, as well as discussing the case with appropriate personnel and various consultants. The critical care time associated with the care of this patient is exclusive of any procedures or specific interventions.          This dictation was  created using voice recognition software. The accuracy of the dictation is limited to the abilities of the software.  The nursing notes were reviewed and certain aspects of this information were incorporated into this note.      Electronically signed by: Alistair Diaz, 10/25/2017 11:13 AM

## 2017-10-25 NOTE — PROGRESS NOTES
Patient has been discharged, discharge plan discussed and  educated how to applyLlevonox shots on patient. Patient taken down by wheel chair.

## 2017-10-25 NOTE — ED NOTES
BIB [facility] to R1, recently diagnosed on 10/20 w/ stage 4 liver and colon CA.  Just dc'd from [facility] yesterday.  Has not yet started treatments.  Pt c/o increased weakness, dizziness w/ 2 falls yesterday and 1 today due to symptoms .  BLE pitting edema noted.  Pt is groggy, cold to touch.  C/o RUQ pain, + jaundiced sclera.

## 2017-10-25 NOTE — ED NOTES
"Med rec updated and complete  Allergies reviewed  Pts daughter at bedside, had a list of medications went over list and returned medications list back to pts daughter.  Pt states \"We did not fill the FERROUS SULFATE 325 (65) MG\".  "

## 2017-10-25 NOTE — PROCEDURES
"Central line Op Note    Date of operation: 10/25/2017    Preoperative diagnosis: Septic Shock    Postoperative diagnosis: same    Operation performed: Insertion of left internal jugular triple lumen catheter placement.    Surgeon: Dr. Brandon    Anesthesia: local anesthesia    Indications: The patient is a 67 y.o. female. Placement of a central line was performed in the SICU    Procedure: Following informed consent, the patient was properly identified and optimally positioned in bed. Maximal barrier precautions were employed. A\" time out\" was initiated. The correct patient, procedure, and operative site was verified. The patient's allergy status was assessed. Procedural modifications were made as required.    The left neck was prepped with chlorhexidine and draped into a sterile field  The patient was placed in a shallow Trendelenburg position. The internal jugular vein was accessed percutaneously and a wire advanced without resistance. A previously flushed triple lumen catheter was passed using sterile Selldinger technique and secured to the skin with silk sutures. All of the ports flushed and aspirated freely. The site was cleaned. An antibacterial disk was placed about the catheter exit site and dressed with a transparent sterile occlusive dressing.    The patient tolerated the procedure well. There were no apparent immediate complications. A stat portable chest radiograph was ordered.    "

## 2017-10-25 NOTE — ASSESSMENT & PLAN NOTE
Associated with shock, as well as evidence of end organ damage with acute renal failure. The patient has significant leukocytosis, lactic acidosis, and hypotension. She received empiric Zosyn at [facility] this past week but there was no clear evidence of acute infection at that time. Her current chest x-ray is negative. Her UA is pending. Suspected etiology could be either urinary tract or intra-abdominal given her known history of colon cancer and also a possible gallbladder etiology. Surgery was curb sided and recommended a HIDA scan which I've ordered. Potentially, her hypotension, leukocytosis, and lactic acidosis could still be secondary to her malignancy but cannot rule out severe sepsis of unknown etiology. Therefore the patient will be admitted to the ICU for goal-directed therapy and early empiric antibiotics. She will be started on IV vancomycin and Zosyn. She will have fluid resuscitation and then initiation of vasopressor therapy to maintain map greater than 60. I will trend her lactic acid levels. We will monitor her blood and urine cultures.

## 2017-10-25 NOTE — ASSESSMENT & PLAN NOTE
This is a recent diagnosis, we will transition her to IV heparin given her poor renal function. Once her renal function has recovered, we can transition her back to Lovenox.

## 2017-10-25 NOTE — ASSESSMENT & PLAN NOTE
Most likely this is secondary to her underlying malignancy, but the patient was also done on the ground for 2 hours and rhabdomyolysis cannot be ruled out. She will be receiving aggressive fluid resuscitation and I will trend CPK levels.

## 2017-10-25 NOTE — DISCHARGE SUMMARY
Hospital Medicine Discharge Note     Admit Date:  10/20/2017       Discharge Date:   10/24/2017    Attending Physician:  Hugo Ferguson M.D.      Diagnoses (includes active and resolved):     Active Problems:    Pulmonary embolism (CMS-HCC) POA: Yes, stabilized, discharged on Lovenox anticoagulation    Acute and chronic respiratory failure with hypoxia (CMS-HCC) POA: Yes, stabilized    Acute deep vein thrombosis (DVT) of femoral vein of left lower extremity (CMS-HCC) POA: Yes, stable    Suspect Colon cancer metastasized to liver, lung  (CMS-HCC) POA: Yes, to follow outpatient oncology    Stage IV  cancer with Ascending colon mass post-colonoscopy with biopsies to follow outpatient GI    Iron deficiency anemia, stable    Leukocytosis without symptoms of infection likely due to malignancy.     Transaminitis due to colon cancer metastasis to liver    Renal insufficiency,  recommended outpatient follow-up         Hospital Summary (Brief Narrative):         67-year-old female with history of diabetes mellitus type II, acquired lymphedema lower extremities, osteoarthritis, obesity was admitted with symptoms of fatigue,  shortness breath.  Patient had been seen by GI for jaundice CT scan of abdomen and pelvis prior to admission revealing thickening of the ascending colon hepatomegaly with rim enhancing masses also omental nodularity mesenteric and pericolonic masses consistent with carcinomatosis.  The CT of the chest did reveal also right lower lobe pulmonary artery PE, and pulmonary nodules with adenopathy.  Patient was started on Lovenox anticoagulation.  Ultrasound of leg did reveal left lower extremity DVT.  She had findings of Stage 4 cancer (likely colon primary) with carcinomatosis, liver lesions, lung lesions and lymphadenopathy.  CEA elevated at 1586; AFP normal She underwent colonoscopy on 10/20/17 revealing ascending colonic mass at 110 cm biopsied,  benign appearing polyps,  polypoid  distal rectal mass also  biopsied.  With treatment patient stabilized her breathing improved.  She did have elevated white count of 16-20,000, remained afebrile was treated with IV Zosyn empirically not clear source or symptoms of active infection.  Patient had Iron deficiency anemia with hemoglobin stable at 9.3-9.1 range.  She passed her O2 evaluation, saturating 93- 100% on room air.  With help , we arranged for Lovenox on discharge.  I offered her an Oncology referral, She states she would follow with her oncologist across from [facility] following discharge.        Consultants:        Dr. Talon Amador [facility]    Imaging/ Testing:      US-EXTREMITY VENOUS BILATERAL LOWER   Final Result      1.  Exam is positive for acute deep vein thrombosis throughout the left femoral vein, popliteal vein, and deep femoral vein.      2.  No evidence of DVT in the right lower extremity.      Right-sided popliteal cyst is identified.      1.  An Emergent Document Only message has been documented for KRYSTAL ARCE in the [employer]  Critical Result system on 10/21/2017 9:50 AM, Message ID [id number].        CT abdomen and pelvis with contrast 10/20/17  Impression       1.  Wall thickening of the ascending colon is highly suspicious for colonic malignancy.    2.  Hepatomegaly with multiple coalescing hypodense rim enhancing masses is consistent with metastatic disease.    3.  Omental nodularity with mesenteric and pericolonic masses are consistent with carcinomatosis.    4.  Small amount of perihepatic and pelvic ascites.    5.  Retrocrural, retroperitoneal, and periportal adenopathy is consistent with metastasis.    6.  Multiple bilateral pulmonary nodules are consistent with metastatic disease.    7.  Questionable filling defect in a right lower lobe pulmonary artery on a single image. CTA of the pulmonary arteries would be helpful for further evaluation.    8.  Atherosclerosis.       CT scan of chest with contrast 10/20/17:      1.   Right lower lobe pulmonary embolus. No CT evidence for right ventricular strain.    2.  Multiple scattered bilateral pulmonary nodules are highly suspicious for metastatic disease.    3.  Right hilar adenopathy is suspicious for metastatic disease.    Procedures:          Talon Amador M.D.   Gastroenterology        DATE OF SERVICE:  10/23/2017     PREPROCEDURE DIAGNOSES:  Ascending colon mass, multiple liver lesions, and   omental caking and adenopathies, suspicious for metastatic disease.     POSTPROCEDURE DIAGNOSES:  1.  Ascending colon mass, partially visualized, located 110 cm from the anal   verge in the ascending colon, biopsied.  2.  Benign appearing polyps x2 (transverse at 70 and sigmoid at 20), not   removed or biopsied.  3.  Polypoid distal rectal mass with eroded head, 1.4 cm diameter, biopsied.     PROCEDURE PERFORMED:  Colonoscopy to the ascending colon at 110 cm with   biopsies.               Discharge Medications:             Medication List      START taking these medications      Instructions   enoxaparin 100 MG/ML Soln inj  Commonly known as:  LOVENOX   Inject 100 mg as instructed every 12 hours.  Dose:  100 mg     ferrous sulfate 325 (65 Fe) MG EC tablet   Take 1 Tab by mouth 2 times a day, with meals.  Dose:  325 mg     tramadol 50 MG Tabs  Commonly known as:  ULTRAM   Take 1 Tab by mouth every 8 hours as needed for Moderate Pain.  Dose:  50 mg        CHANGE how you take these medications      Instructions   VISION-EARNESTINE PRESERVE PO  What changed:  Another medication with the same name was removed. Continue taking this medication, and follow the directions you see here.   Take  by mouth every day.        CONTINUE taking these medications      Instructions   cephALEXin 500 MG Caps  Commonly known as:  KEFLEX   Take 500 mg by mouth 3 times a day. Unknown course started 10/18/17 for leg drainage  Dose:  500 mg     furosemide 40 MG Tabs  Commonly known as:  LASIX   Take 40 mg by mouth every  day.  Dose:  40 mg               Diet:       DIET ORDERS (Through next 24h)    Cardiac as tolerated             Activity:   As tolerated.      Code status:   Full code    Primary Care Provider:    Manju Diaz P.A.-C.    Follow up appointment details :      .  Manju Diaz P.A.-C.  [address]  [address]  [place] [address]  [phone]    Schedule an appointment as soon as possible for a visit in 1 week  Hospital follow-up appointment with PCP    oncology as scheduled    In 2 weeks      Talon Amador M.D.  [address]  [place] [address]  [phone]    In 2 weeks      Future Appointments  Date Time Provider Department [place]   10/25/2017 2:30 PM CARE MANAGER EVELYN WELCH   10/26/2017 10:00 AM Shiva Rios M.D. [facility] None   10/31/2017 2:00 PM LUCIANA Golden           Time spent on discharge day patient visit: 40  minutes    #################################################

## 2017-10-25 NOTE — ASSESSMENT & PLAN NOTE
This is a recent new diagnosis. We'll need oncology consult.    Path report:  A. Colon mass, biopsy:         Positive for at least intramucal adenocarcinoma.         The biopsies are superficial and do not allow for definitive          assessment of the submucosa for invasion.  B. Distal rectal polypoid mass, biopsy:         Positive for well-differentiated neuroendocrine tumor.         Zero mitoses / 10 high-powered fields.         Ki-67: <2% tumor cells positive.

## 2017-10-25 NOTE — H&P
Hospital Medicine History and Physical    Date of Service  10/25/2017    Chief Complaint  Chief Complaint   Patient presents with   • Weakness   • Dizziness   • RUQ Pain       History of Presenting Illness  67 y.o. female who presented on 10/25/2017 with Weakness and falls. The patient was recently admitted to the [facility] and was discharged yesterday with a new diagnosis of widely metastatic colon cancer, new PE, and DVT. Patient had persistently elevated white blood cell count at the other facility and had received empiric IV Zosyn but no acute infection could be found. She had been improved and discharged home for outpatient follow-up with oncology to discuss her new cancer diagnosis. Unfortunately, the patient states that she has felt weak and dizzy since going home. She is unable to ambulate more than 1 or 2 steps and in fact had an associated fall yesterday during which time she was down for approximately 2 hours. She also reports worsening abdominal pain particularly in the right upper quadrant which she describes as cramping, intermittent, sharp, without any alleviating or aggravating factors. Because of her pain, she has had poor by mouth intake of both solids and liquids. She is also had very minimal urine output. She denies any headaches, chest pains, diarrhea, or dysuria.      Primary Care Physician  Manju Diaz P.A.-C.    Consultants  None    Code Status  Full    Review of Systems  Review of Systems   Constitutional: Positive for chills, fever and malaise/fatigue.   HENT: Negative for congestion and sore throat.    Eyes: Negative for photophobia.   Respiratory: Positive for shortness of breath. Negative for cough and wheezing.    Cardiovascular: Negative for chest pain and palpitations.   Gastrointestinal: Positive for abdominal pain. Negative for diarrhea, nausea and vomiting.   Genitourinary: Negative for dysuria.   Musculoskeletal: Positive for falls. Negative for myalgias.  "  Skin: Negative.    Neurological: Positive for weakness. Negative for dizziness, tingling, focal weakness and headaches.   Psychiatric/Behavioral: Negative for depression and suicidal ideas.        Past Medical History  Past Medical History:   Diagnosis Date   • Macular degeneration 10/23/2017   • Blind right eye 10/23/2017   • Acquired lymphedema of leg     left ankle   • Anesthesia     \"My pulse ox gets low in post op\"   • Arthritis     osteo left knee   • Colon cancer (CMS-HCC)    • Dental disorder     dentures, upper and lowers   • Liver cancer (CMS-HCC)    • Other specified symptom associated with female genital organs    • Type II or unspecified type diabetes mellitus without mention of complication, uncontrolled     hx of, hasn't taken meds for 5 years       Surgical History  Past Surgical History:   Procedure Laterality Date   • COLONOSCOPY - ENDO  10/23/2017    Procedure: COLONOSCOPY - ENDO;  Surgeon: Talon Amador M.D.;  Location: [facility];  Service: Gastroenterology   • EXTENSOR TENDON REPAIR Left 11/10/2016    Procedure: EXTENSOR TENDON REPAIR - RING FINGER;  Surgeon: Philip De La Cruz M.D.;  Location: SURGERY [facility];  Service:    • HYSTEROSCOPY WITH VIDEO OPERATIVE  8/20/2009    Performed by SHERRI ELI at SURGERY SAME DAY [facility] ORS   • DILATION AND CURETTAGE  8/20/2009    Performed by SHERRI ELI at SURGERY SAME DAY [facility] ORS   • VEIN STRIPPING  2004    LT   • TONSILLECTOMY  1968       Medications  No current facility-administered medications on file prior to encounter.      Current Outpatient Prescriptions on File Prior to Encounter   Medication Sig Dispense Refill   • tramadol (ULTRAM) 50 MG Tab Take 1 Tab by mouth every 8 hours as needed for Moderate Pain. 30 Tab 0   • furosemide (LASIX) 40 MG Tab Take 40 mg by mouth every day.     • enoxaparin (LOVENOX) 100 MG/ML Solution inj Inject 100 mg as instructed every 12 hours. 60 Syringe 2   • Multiple " Vitamins-Minerals (VISION-EARNESTINE PRESERVE PO) Take 1 Tab by mouth every day.     • cephALEXin (KEFLEX) 500 MG Cap Take 500 mg by mouth 3 times a day. Unknown course started 10/18/17 for leg drainage         Family History  Family History   Problem Relation Age of Onset   • No Known Problems Mother    • Heart Disease Father    • Diabetes Father        Social History  Social History   Substance Use Topics   • Smoking status: Never Smoker   • Smokeless tobacco: Never Used   • Alcohol use No       Allergies  No Known Allergies     Physical Exam  Laboratory   Hemodynamics  Temp (24hrs), Av.4 °C (95.8 °F), Min:35.4 °C (95.8 °F), Max:35.4 °C (95.8 °F)   Temperature: (!) 35.4 °C (95.8 °F)  Pulse  Av.4  Min: 66  Max: 70 Heart Rate (Monitored): 70  Blood Pressure : (!) 86/27, NIBP: (!) 92/50      Respiratory      Respiration: 17, Pulse Oximetry: 98 %             Physical Exam   Constitutional: She is oriented to person, place, and time. No distress.   Chronically ill-appearing, frail   HENT:   Head: Normocephalic and atraumatic.   Right Ear: External ear normal.   Left Ear: External ear normal.   Eyes: EOM are normal. Right eye exhibits no discharge. Left eye exhibits no discharge. Scleral icterus is present.   Neck: Neck supple. No JVD present.   Cardiovascular: Regular rhythm and normal heart sounds.    Tachycardia   Pulmonary/Chest: Effort normal and breath sounds normal. No respiratory distress. She exhibits no tenderness.   Abdominal: Soft. Bowel sounds are normal. She exhibits no distension. There is tenderness.   Musculoskeletal: Normal range of motion. She exhibits no edema.   Neurological: She is alert and oriented to person, place, and time. No cranial nerve deficit.   Skin: Skin is warm and dry. She is not diaphoretic. No erythema.   Psychiatric: She has a normal mood and affect. Her behavior is normal.   Nursing note and vitals reviewed.    Distal pulses intact, capillary refill less than 3 seconds   Recent  Labs      10/22/17   1208  10/23/17   0452  10/25/17   1028   WBC  20.5*  16.6*  24.4*   RBC  3.99*  3.93*  3.67*   HEMOGLOBIN  9.4*  9.3*  8.9*   HEMATOCRIT  31.1*  30.6*  29.4*   MCV  77.9*  77.9*  80.1*   MCH  23.6*  23.7*  24.3*   MCHC  30.2*  30.4*  30.3*   RDW  56.4*  55.8*  58.6*   PLATELETCT  264  226  210   MPV  9.5  9.7  10.6     Recent Labs      10/23/17   0452  10/25/17   1028   SODIUM  132*  130*   POTASSIUM  3.9  5.3   CHLORIDE  95*  94*   CO2  22  13*   GLUCOSE  102*  80   BUN  41*  59*   CREATININE  1.32  2.80*   CALCIUM  8.6  6.8*     Recent Labs      10/23/17   0452  10/25/17   1028  10/25/17   1056   ALTSGPT  190*  706*   --    ASTSGOT  750*  2843*   --    ALKPHOSPHAT  558*  752*   --    TBILIRUBIN  7.9*  8.8*   --    LIPASE   --    --   48   GLUCOSE  102*  80   --      Recent Labs      10/23/17   0452   INR  1.52*             Lab Results   Component Value Date    TROPONINI 0.04 10/20/2017       Imaging  Ct-abdomen With & W/o, Pelvis With    Result Date: 10/20/2017  10/20/2017 9:34 AM HISTORY/REASON FOR EXAM:  Jaundice. Abnormal ultrasound. TECHNIQUE/EXAM DESCRIPTION:  Quadphase CT scan of the abdomen without and with contrast. Initial precontrast images were obtained from the diaphragmatic domes through the iliac crests using helical technique.  Following the administration of nonionic contrast in an intravenous bolus fashion, and postcontrast, thin-section helical scanning obtained from the diaphragmatic domes through the iliac crests.  Imaging was obtained in the arterial, venous, and 5 minute delayed phase of contrast enhancement. Additional contrast enhanced helical scanning of the pelvis was obtained from the iliac crest through the pubic symphysis. 100 mL of Omnipaque 350 nonionic contrast was administered without complication. Low dose optimization technique was utilized for this CT exam including automated exposure control and adjustment of the mA and/or kV according to patient size.  COMPARISON:  Ultrasound 10/3/2017 FINDINGS: There are multiple bilateral pulmonary nodules in the lung bases, right greater than left. The largest in the right lower lobe measures 1.7 cm. There is a questionable right lower lobe pulmonary embolus on image 6, series 3. There are coronary artery calcifications. Abdomen: The liver is enlarged with multiple hypodense rim-enhancing masses. Many of the hepatic masses coalesce. A mass in the right hepatic lobe on image 35 measures approximately 8 cm. A mass in the anterior left hepatic lobe on image 61 measures 7.7 cm. The spleen, adrenal glands, and pancreas are unremarkable. There is a small amount of perihepatic ascites. A soft tissue nodule in the right abdomen on image 114 measures 2.9 cm. Soft tissue masses in the mesentery measure up to approximately 2.5 cm. There are multiple nodules in the omentum as well. A hypodense mass in the mid left kidney is consistent with a simple cyst. There is extensive periportal and retroperitoneal adenopathy. A periportal lymph node on image 192 measures 1.4 cm short axis. A retrocrural lymph node measures approximately 10 mm short axis on image 171. There is bowel wall thickening in the ascending colon which is highly suspicious for a colon cancer. The remainder of the bowel is unremarkable. The appendix is normal in appearance. There is a moderate amount of colonic stool. There are mild scattered arterial calcifications. Pelvis: The bladder is partially decompressed. There is a small amount of pelvic ascites. The ovaries are grossly unremarkable on CT. There is mild spinal curvature with degenerative change. There is grade 1 spondylolisthesis at L4-5 secondary to bilateral L4 spondylolysis.     1.  Wall thickening of the ascending colon is highly suspicious for colonic malignancy. 2.  Hepatomegaly with multiple coalescing hypodense rim enhancing masses is consistent with metastatic disease. 3.  Omental nodularity with mesenteric  and pericolonic masses are consistent with carcinomatosis. 4.  Small amount of perihepatic and pelvic ascites. 5.  Retrocrural, retroperitoneal, and periportal adenopathy is consistent with metastasis. 6.  Multiple bilateral pulmonary nodules are consistent with metastatic disease. 7.  Questionable filling defect in a right lower lobe pulmonary artery on a single image. CTA of the pulmonary arteries would be helpful for further evaluation. 8.  Atherosclerosis. Preliminary report faxed to Dr. Jarrett on 10/20/2017 at 1116 hours. - [facility]    Dx-chest-portable (1 View)    Result Date: 10/25/2017  10/25/2017 10:41 AM HISTORY/REASON FOR EXAM: Sepsis TECHNIQUE/EXAM DESCRIPTION AND NUMBER OF VIEWS: Single portable view of the chest. COMPARISON: None FINDINGS: There is no evidence of focal consolidation or evidence of pulmonary edema. There is no pleural effusion. The heart is normal in size. The lungs are hypoventilated.     No evidence of acute cardiopulmonary process.    Dx-elbow-complete 3+ Right    Result Date: 9/27/2017 9/27/2017 2:38 PM HISTORY/REASON FOR EXAM:  Pain/Deformity Following Trauma Laceration to right elbow after fall today. ?Laceration over olecranon TECHNIQUE/EXAM DESCRIPTION AND NUMBER OF VIEWS:  3 views of the RIGHT elbow. COMPARISON: None FINDINGS: No acute fracture or dislocation. No elbow joint effusion. Mild soft tissue irregularity about the olecranon. Mild osteoarthritis of the elbow joint.     No acute osseous abnormality.    Us-extremity Venous Bilateral Lower    Result Date: 10/21/2017  10/21/2017 7:05 AM HISTORY/REASON FOR EXAM:  Bilateral lower extremity edema TECHNIQUE/EXAM DESCRIPTION:  Bilateral lower extremity Doppler venous ultrasound was performed. Duplex Doppler and spectral analysis were performed. COMPARISON:  None. FINDINGS:  The right common femoral vein, femoral vein, popliteal vein, and deep veins of the right calf are fully compressible using a graded compression technique.   Duplex Doppler wave forms demonstrate normal phasicity and pulsatility.  No intraluminal thrombus is identified. There is occlusive thrombus throughout the deep femoral vein, entire femoral vein, and popliteal vein in the left lower extremity. Other veins are fully compressible with no intraluminal thrombus. Right-sided popliteal fluid collection measures 5.6 x 1.5  x 4.9 cm.     1.  Exam is positive for acute deep vein thrombosis throughout the left femoral vein, popliteal vein, and deep femoral vein. 2.  No evidence of DVT in the right lower extremity. Right-sided popliteal cyst is identified. 1.  An Emergent Document Only message has been documented for KRYSTAL ARCE in the [employer]  Critical Result system on 10/21/2017 9:50 AM, Message ID [id number].    Us-liver And Biliary Tree    Result Date: 10/3/2017  10/3/2017 10:09 AM HISTORY/REASON FOR EXAM:  Elevated alkaline phosphatase Abdominal pain TECHNIQUE/EXAM DESCRIPTION AND NUMBER OF VIEWS:  Real-time sonography of the liver and biliary tree. COMPARISON: None FINDINGS: Liver parenchyma appears heterogeneous. Multiple rounded areas of increased echogenicity are noted. Liver surface appears nodular. The liver measures 28.17 cm. The gallbladder is normal. There is no evidence of cholelithiasis. The gallbladder wall thickness measures 0.40 cm. There is no pericholecystic fluid. The common duct measures 0.19 cm. The visualized pancreas is unremarkable. The visualized aorta is normal in caliber. Intrahepatic IVC is patent. The portal vein is patent with hepatopetal flow. The right kidney measures 10.94 cm. Mild amount of free peritoneal fluid is identified.     1.  Liver parenchyma appears heterogeneous and masses do appear to be present which could represent regenerating nodules although carcinoma is also possible. Liver is also enlarged. Cirrhosis is likely present. Dedicated MRI or CT of the liver is recommended for further evaluation. 2.  Mild gallbladder wall  thickening could be due to portal hypertension. 3.  Ascites is present.    Echocardiogram Comp W/o Cont    Result Date: 10/7/2017  Transthoracic Echo Report Echocardiography Laboratory CONCLUSIONS No prior study is available for comparison. Normal left ventricular chamber size. Left ventricular ejection fraction is visually estimated to be 65%. Normal pericardium without effusion. SHANNAN DERW Exam Date:         10/07/2017                    15:07 Exam Location:     Out Patient Priority:          Routine Ordering Physician:        SONIA RAYMUNDO Referring Physician:       [id number] Aorra Sonographer:               Alan Joseph RDCS Age:    67     Gender:    F MRN:    [id number] :    1950 BSA:    2.2    Ht (in):    68     Wt (lb):    238 Exam Type:     Complete Indications:     Edema ICD Codes:       782.3 CPT Codes:       37354 BP:   102    /   64     HR: Technical Quality:       Fair MEASUREMENTS  (Male / Female) Normal Values 2D ECHO LV Diastolic Diameter PLAX        3.5 cm                4.2 - 5.9 / 3.9 - 5.3 cm LV Systolic Diameter PLAX         2.2 cm                2.1 - 4.0 cm IVS Diastolic Thickness           1.1 cm                LVPW Diastolic Thickness          0.88 cm               LVOT Diameter                     2.1 cm                Estimated LV Ejection Fraction    65 %                  LV Ejection Fraction MOD BP       61.7 %                >= 55  % LV Ejection Fraction MOD 4C       73.8 %                LV Ejection Fraction MOD 2C       40.5 %                DOPPLER AV Peak Velocity                  1.8 m/s               AV Peak Gradient                  13.6 mmHg             AV Mean Gradient                  8.1 mmHg              LVOT Peak Velocity                1.1 m/s               AV Area Cont Eq vti               2 cm²                 MV Velocity Time Integral         19.9 cm               Mitral E Point Velocity           0.65 m/s              Mitral E to A Ratio                0.66                  Mitral A Duration                 104 ms                MV Pressure Half Time             41.3 ms               MV Area PHT                       5.3 cm²               MV Deceleration Time              142 ms                * Indicates values subject to auto-interpretation LV EF:  65    % FINDINGS Left Ventricle Normal left ventricular chamber size. Normal left ventricular wall thickness. Normal left ventricular systolic function. Left ventricular ejection fraction is visually estimated to be 65%. Normal regional wall motion. Normal diastolic function. Right Ventricle Normal right ventricular size. Normal right ventricular systolic function. Right Atrium Normal right atrial size. Vena cava is not well visualized. Left Atrium Normal left atrial size. Mitral Valve No mitral stenosis. No mitral regurgitation. Aortic Valve No aortic stenosis. No aortic insufficiency. Tricuspid Valve No tricuspid stenosis. No tricuspid regurgitation. Pulmonic Valve No pulmonic stenosis. No pulmonic insufficiency. Pericardium Normal pericardium without effusion. Aorta Normal aortic root. Ascending aorta not well visualized. Farzad Gonsalez MD, FACC (Electronically Signed) Final Date:     07 October 2017                 20:23    Ct-cta Chest Pulmonary Artery W/ Recons    Result Date: 10/20/2017  10/20/2017 3:54 PM HISTORY/REASON FOR EXAM:  Abnormal CT abdomen with questionable PE in the right lower lobe TECHNIQUE/EXAM DESCRIPTION: CT angiogram scan for pulmonary embolism with contrast, with reconstructions. 1.25 mm helical sections were obtained from the lung apices through the lung bases following the rapid bolus administration of 45 mL of Omnipaque 350 nonionic contrast. Thin-section overlapping reconstruction interval was utilized. Coronal reconstructions were generated from the axial data. MIP post processing was performed and utilized for the interpretation. Low dose optimization technique was utilized for  this CT exam including automated exposure control and adjustment of the mA and/or kV according to patient size. COMPARISON: CT abdomen from 10:06 AM FINDINGS: Pulmonary Embolism: Right lower lobe pulmonary embolus. Only if positive for PE: RV diameter: 3.2 cm. LV diameter: 3 cm. RV/LV ratio: 1.07. (Greater than 1.3 is abnormal.) Additional Comments: The main pulmonary artery is of normal caliber.. Lungs: There are multiple scattered pulmonary nodules. The largest nodule in the right lower lobe measures approximately 1.7 cm. The largest in the left upper lobe measures 8 mm on image 31. Pleura: No pleural effusion. Nodes: Enlarged right hilar lymph node measures 11 mm short axis. No left hilar or mediastinal adenopathy is appreciated. Additional findings: There is ill-defined fluid in the paraesophageal space of the lower chest. Upper abdominal findings are discussed on recent CT abdomen. Degenerative changes are in the spine.     1.  Right lower lobe pulmonary embolus. No CT evidence for right ventricular strain. 2.  Multiple scattered bilateral pulmonary nodules are highly suspicious for metastatic disease. 3.  Right hilar adenopathy is suspicious for metastatic disease. Comment: Results discussed with Dr. Jarrett at 5:15 PM. The patient was instructed to go to [facility] emergency room.      Assessment/Plan     Anticipate that patient will needGreater than 2 midnights for management of the discussed medical issues.    This dictation was created using voice recognition software. The accuracy of the dictation is limited to the abilities of the software. I expect there may be some errors of grammar and possibly content.    * Sepsis (CMS-HCC)- (present on admission)   Assessment & Plan    Associated with shock, as well as evidence of end organ damage with acute renal failure. The patient has significant leukocytosis, lactic acidosis, and hypotension. She received empiric Zosyn at [facility] this past  week but there was no clear evidence of acute infection at that time. Her current chest x-ray is negative. Her UA is pending. Suspected etiology could be either urinary tract or intra-abdominal given her known history of colon cancer and also a possible gallbladder etiology. Surgery was curb sided and recommended a HIDA scan which I've ordered. Potentially, her hypotension, leukocytosis, and lactic acidosis could still be secondary to her malignancy but cannot rule out severe sepsis of unknown etiology. Therefore the patient will be admitted to the ICU for goal-directed therapy and early empiric antibiotics. She will be started on IV vancomycin and Zosyn. She will have fluid resuscitation and then initiation of vasopressor therapy to maintain map greater than 60. I will trend her lactic acid levels. We will monitor her blood and urine cultures.        Hypotension- (present on admission)   Assessment & Plan    Suspected secondary to septic shock, treatment as noted above.        Elevated CPK   Assessment & Plan    Most likely this is secondary to her underlying malignancy, but the patient was also done on the ground for 2 hours and rhabdomyolysis cannot be ruled out. She will be receiving aggressive fluid resuscitation and I will trend CPK levels.        ARF (acute renal failure) (CMS-HCC)- (present on admission)   Assessment & Plan    Patient has had poor by mouth intake since discharge from [facility], she also reports a very minimal urine output. Most likely prerenal azotemia secondary to volume depletion and poor perfusion from sepsis and expect improvement with IV fluid resuscitation. However, I will check urine electrolytes and a renal ultrasound. Patient has been on Lovenox for a new PE diagnosis, acute renal injury secondary to medication is also a possibility. Avoid nephrotoxic medications when possible.        Lactic acidosis- (present on admission)   Assessment & Plan    Suspected secondary to  sepsis but cannot rule out underlying malignancy on confusion. Treatment is noted for sepsis noted above.        Colon cancer metastasized to lung (CMS-HCC)- (present on admission)   Assessment & Plan    This is a recent new diagnosis. We'll need oncology follow-up once medically stable.        Pulmonary embolism (CMS-HCC)- (present on admission)   Assessment & Plan    This is a recent diagnosis, we will transition her to IV heparin given her poor renal function. Once her renal function has recovered, we can transition her back to Lovenox.          Prophylaxis: Heparin for DVT prophylaxis, no PPI indicated, stool softeners ordered     Patient is critically ill, greater than 36 minutes of critical care time were spent in the admission, planning, and management of this patient not including procedures without overlap.

## 2017-10-26 PROBLEM — A41.9 SEPTIC SHOCK (HCC): Status: ACTIVE | Noted: 2017-01-01

## 2017-10-26 PROBLEM — A41.9 SEPSIS (HCC): Status: RESOLVED | Noted: 2017-01-01 | Resolved: 2017-01-01

## 2017-10-26 PROBLEM — R65.21 SEPTIC SHOCK (HCC): Status: RESOLVED | Noted: 2017-01-01 | Resolved: 2017-01-01

## 2017-10-26 PROBLEM — R57.9 SHOCK (HCC): Status: ACTIVE | Noted: 2017-01-01

## 2017-10-26 PROBLEM — E43 PROTEIN-CALORIE MALNUTRITION, SEVERE (HCC): Status: ACTIVE | Noted: 2017-01-01

## 2017-10-26 PROBLEM — A41.9 SEPTIC SHOCK (HCC): Status: RESOLVED | Noted: 2017-01-01 | Resolved: 2017-01-01

## 2017-10-26 PROBLEM — R65.21 SEPTIC SHOCK (HCC): Status: ACTIVE | Noted: 2017-01-01

## 2017-10-26 PROBLEM — I95.9 HYPOTENSION: Status: RESOLVED | Noted: 2017-01-01 | Resolved: 2017-01-01

## 2017-10-26 NOTE — PROGRESS NOTES
[facility] Hospitalist Progress Note    Date of Service: 10/26/2017    Chief Complaint  67 y.o. female admitted 10/25/2017 with abdominal pain.    Interval Problem Update  Ms. Landry has a hx of recently diagnosed metastatic cancer that was admitted to the ICU in shock requiring multiple IV pressors. She was found to have a leukocytosis and acute renal failure. 45 ml urine over night.   I met with her , Kavin, at bedside.   Consultants/Specialty  Cinelli, surgery  oncology  Blanca, oncology  Disposition  ICU        Review of Systems   Unable to perform ROS: Mental acuity      Physical Exam  Laboratory/Imaging   Hemodynamics  Temp (24hrs), Av.7 °C (96.2 °F), Min:35.4 °C (95.8 °F), Max:36.6 °C (97.9 °F)   Temperature: 36.6 °C (97.9 °F)  Pulse  Av.5  Min: 42  Max: 106 Heart Rate (Monitored): 94  Blood Pressure : (!) 86/27, NIBP: 104/54      Respiratory      Respiration: 14, Pulse Oximetry: 99 %, O2 Daily Delivery Respiratory : Silicone Nasal Cannula     Work Of Breathing / Effort: Moderate;Shallow  RUL Breath Sounds: Diminished, RML Breath Sounds: Diminished, RLL Breath Sounds: Diminished, JULISSA Breath Sounds: Diminished, LLL Breath Sounds: Diminished    Fluids    Intake/Output Summary (Last 24 hours) at 10/26/17 0810  Last data filed at 10/26/17 0600   Gross per 24 hour   Intake          3422.39 ml   Output               50 ml   Net          3372.39 ml       Nutrition  Orders Placed This Encounter   Procedures   • Diet Order     Standing Status:   Standing     Number of Occurrences:   1     Order Specific Question:   Diet:     Answer:   Regular [1]     Physical Exam   Constitutional: No distress.   Eyes:   +icterus   Neck:   Right IJ central line.   Cardiovascular:   Murmur heard.  Pulmonary/Chest: Effort normal.   Abdominal: Soft. She exhibits no distension. There is no tenderness.   Musculoskeletal: She exhibits edema.   Neurological:   Somnolent, she is a non-historian.   Skin: Skin is warm. She is not  diaphoretic.   jaundiced       Recent Labs      10/25/17   1028  10/26/17   0045   WBC  24.4*  27.0*   RBC  3.67*  3.74*   HEMOGLOBIN  8.9*  8.9*   HEMATOCRIT  29.4*  31.0*   MCV  80.1*  82.9   MCH  24.3*  23.8*   MCHC  30.3*  28.7*   RDW  58.6*  59.8*   PLATELETCT  210  224   MPV  10.6  10.0     Recent Labs      10/25/17   1028  10/26/17   0045   SODIUM  130*  129*   POTASSIUM  5.3  5.5   CHLORIDE  94*  97   CO2  13*  14*   GLUCOSE  80  78   BUN  59*  61*   CREATININE  2.80*  3.24*   CALCIUM  6.8*  6.5*     Recent Labs      10/26/17   0045   APTT  72.8*                  Assessment/Plan     Shock (CMS-HCC)- (present on admission)   Assessment & Plan    Severe hypotension requiring IV pressors and IV fluids.  Appears vasodistributive shock likely multifactorial: may be septic shock though may be due to metastatic cancer.   Broad spectrum antibiotics.  WBC up to 27.        Elevated CPK   Assessment & Plan    Most likely this is secondary to her underlying malignancy, but the patient was also done on the ground for 2 hours and rhabdomyolysis cannot be ruled out. She will be receiving aggressive fluid resuscitation and I will trend CPK levels.        ARF (acute renal failure) (CMS-HCC)- (present on admission)   Assessment & Plan    Cr has gone up to 3.24 despite IV fluids.        Lactic acidosis- (present on admission)   Assessment & Plan    Suspected secondary to sepsis but cannot rule out underlying malignancy on confusion. Treatment is noted for sepsis noted above.        Colon cancer metastasized to lung (CMS-HCC)- (present on admission)   Assessment & Plan    This is a recent new diagnosis. We'll need oncology consult.    Path report:  A. Colon mass, biopsy:         Positive for at least intramucal adenocarcinoma.         The biopsies are superficial and do not allow for definitive          assessment of the submucosa for invasion.  B. Distal rectal polypoid mass, biopsy:         Positive for well-differentiated  neuroendocrine tumor.         Zero mitoses / 10 high-powered fields.         Ki-67: <2% tumor cells positive.        Pulmonary embolism (CMS-HCC)- (present on admission)   Assessment & Plan    This is a recent diagnosis, we will transition her to IV heparin given her poor renal function. Once her renal function has recovered, we can transition her back to Lovenox.            Reviewed items::  Labs reviewed and Medications reviewed  Magana catheter::  Critically Ill - Requiring Accurate Measurement of Urinary Output  DVT prophylaxis pharmacological::  Heparin      Pt is critically ill in the ICU, 32 minutes of critical care time spent with patient, family, and nursing and in specific management of hypotension requiring IV pressors.

## 2017-10-26 NOTE — CONSULTS
"Consult Note: Oncology    Date of consultation: 10/26/2017 4:11 PM    Referring provider: Dr Bradshaw  Reason for consultation: Metastatic colon carcinoma, sepsis.    History of presenting illness:      Denia Landry   is a 67 y.o. year old female who is currently admitted to the ICU with severe sepsis. Patient was recently diagnosed with metastatic colon carcinoma. After she presented with new PE and DVT. She was recently admitted to [facility] with jaundice. CT scan showed thickening of the ascending colon hepatomegaly with rim enhancing masses also omental nodularity mesenteric and pericolonic masses consistent with carcinomatosis.  The CT of the chest did reveal also right lower lobe pulmonary artery PE, and pulmonary nodules with adenopathy.  Patient was started on Lovenox anticoagulation. CEA elevated at 1586; AFP normal She underwent colonoscopy on 10/20/17 revealing ascending colonic mass at 110 cm , which was biopsy-proven to be metastatic colon carcinoma.    She is currently critically ill, on multiple antibiotics on maximum pressors. She is not oriented. Family was at the bedside. 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   • Colon cancer (CMS-HCC)    • Dental disorder     dentures, upper and lowers   • Liver cancer (CMS-HCC)    • Macular degeneration 10/23/2017   • Other specified symptom associated with female genital organs    • Type II or unspecified type diabetes mellitus without mention of complication, uncontrolled     hx of, hasn't taken meds for 5 years       Past surgical history:    Past Surgical History:   Procedure Laterality Date   • COLONOSCOPY - ENDO  10/23/2017    Procedure: COLONOSCOPY - ENDO;  Surgeon: Talon Amador M.D.;  Location: " "SURGERY [facility];  Service: Gastroenterology   • EXTENSOR TENDON REPAIR Left 11/10/2016    Procedure: EXTENSOR TENDON REPAIR - RING FINGER;  Surgeon: Philip De La Cruz M.D.;  Location: [facility];  Service:    • HYSTEROSCOPY WITH VIDEO OPERATIVE  8/20/2009    Performed by SHERRI ELI at SURGERY SAME DAY [facility]   • DILATION AND CURETTAGE  8/20/2009    Performed by SHERRI ELI at SURGERY SAME DAY [facility]   • VEIN STRIPPING  2004    LT   • TONSILLECTOMY  1968       Allergies:  Review of patient's allergies indicates no known allergies.    Medications:    Current Facility-Administered Medications   Medication Dose Route Frequency Provider Last Rate Last Dose   • MD ALERT...adult comfort care   Other PRN Brandon Bradshaw M.D.       • atropine 1 % ophthalmic solution 2 Drop  2 Drop Sublingual Q4HRS PRN Brandon Bradshaw M.D.       • morphine 1 mg/mL in 50 mL NS (Continuous Infusion)  10 mg/hr Intravenous Continuous Brandon Bradshaw M.D.   Stopped at 10/26/17 1400       Social History:     Social History     Social History   • Marital status:      Spouse name: N/A   • Number of children: N/A   • Years of education: N/A     Occupational History   • Not on file.     Social History Main Topics   • Smoking status: Never Smoker   • Smokeless tobacco: Never Used   • Alcohol use No   • Drug use: No   • Sexual activity: No     Other Topics Concern   • Not on file     Social History Narrative   • No narrative on file       Family History:     Family History   Problem Relation Age of Onset   • No Known Problems Mother    • Heart Disease Father    • Diabetes Father        Review of Systems:  All other review of systems are negative except what was mentioned above in the HPI.      Physical Exam:  Vitals:   BP (!) 86/27   Pulse 98   Temp 36.6 °C (97.9 °F)   Resp (!) 29   Ht 1.727 m (5' 7.99\")   Wt 121 kg (266 lb 12.1 oz)   SpO2 97%   BMI 40.57 kg/m²     General: In mild " distress  HEENT: No pallor, icterus. Oropharynx clear.   Neck: Supple, no palpable masses.  Lymph nodes: No palpable cervical, supraclavicular, axillary or inguinal lymphadenopathy.    CVS: regular rate and rhythm, no rubs or gallops  RESP: Clear to auscultate bilaterally, no wheezing or crackles.   ABD: Soft, non tender, non distended, positive bowel sounds, right upper quadrant fullness   EXT: No edema or cyanosis  CNS: Alert and oriented x3, No focal deficits.  Skin- No rash      Labs:   Recent Labs      10/25/17   1028  10/26/17   0045   RBC  3.67*  3.74*   HEMOGLOBIN  8.9*  8.9*   HEMATOCRIT  29.4*  31.0*   PLATELETCT  210  224   APTT   --   72.8*     Lab Results   Component Value Date/Time    SODIUM 129 (L) 10/26/2017 12:45 AM    POTASSIUM 5.5 10/26/2017 12:45 AM    CHLORIDE 97 10/26/2017 12:45 AM    CO2 14 (L) 10/26/2017 12:45 AM    GLUCOSE 78 10/26/2017 12:45 AM    BUN 61 (H) 10/26/2017 12:45 AM    CREATININE 3.24 (H) 10/26/2017 12:45 AM    CREATININE 0.73 07/10/2009 12:22 PM    BUNCREATRAT 21 07/10/2009 12:22 PM    GLOMRATE >59 07/10/2009 12:22 PM        Assessment and Plan:  Metastatic colon carcinoma, now with sepsis- unfortunately, she has developed multiorgan failure and is critically ill. She is already on maximum pressors. She is in some distress due to pain related to her malignancy. Her long discussion with the patient's . Informed him that her prognosis is extremely poor, as she appears to be failing the supportive measures and aggressive antibiotics. Informed him that she is developing renal insufficiency, liver failure. Informed him that even if she recurs, she will not be a candidate for chemotherapy in the near future. We discussed the goals of care and I recommended no further aggressive measures. Strongly encouraged them to continue comfort measures. I did not have the discussion, they opted for comfort measures. Recommend starting morphine. Informed them that her survival is expected  to be few hours to days.    She agreed and verbalized her agreement and understanding with the current plan.  I answered all questions and concerns she has at this time.              Thank you for allowing me to participate in her care.    Please note that this dictation was created using voice recognition software. I have made every reasonable attempt to correct obvious errors, but I expect that there are errors of grammar and possibly content that I did not discover before finalizing the note.      SIGNATURES:  Shiva Rios    CC:  Manju Diaz P.A.-C.  No ref. provider found

## 2017-10-26 NOTE — PROGRESS NOTES
12-hour chart check complete.    Monitor summary: Sinus rhythm-sinus tachycardia    .14/.08/.36

## 2017-10-26 NOTE — PROGRESS NOTES
Patient  at 1350. Monitor shows asystole.  Two RNs verified for 1 minute no pulse, no heart sounds, no breath sounds. Pupils unresponsive.  Notified MD, [facility], .

## 2017-10-26 NOTE — PROGRESS NOTES
Paged Dr. Linton to update on pt's status: pt becoming more restless/anxious, complaining of abdominal pain, breathing more labored, serum calcium critical at 6.5, liver enzymes critical, creatinine 3.24, WBC 27, glucose 78 (pt to have HIDA scan this a.m. And needs to be NPO); instructed to continue to monitor; received orders to switch IV fluids from NS to D5W and administer 1 g of Calcium Gluconate.

## 2017-10-26 NOTE — CONSULTS
"SURGERY CONSULTATION    Date of service: 10/25/2017    Consult requested by: Dr. hitchcock in the emergency department    CHIEF COMPLAINT: Leukocytosis and abdominal pain    HISTORY OF PRESENT ILLNESS:  67-year-old female with likely metastatic colon cancer who was just discharged from [facility] yesterday. She presents with worsening abdominal pain and altered mental status and was found to have a white count greater than 20,000 with associated right upper quadrant abdominal pain. Patient had been receiving IV antibiotics for several days.  states that she was discharged yesterday pain got worse overnight. 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   • Acquired lymphedema of leg       left ankle   • Anesthesia       \"My pulse ox gets low in post op\"   • Arthritis       osteo left knee   • Colon cancer (CMS-HCC)     • Dental disorder       dentures, upper and lowers   • Liver cancer (CMS-HCC)     • Other specified symptom associated with female genital organs     • Type II or unspecified type diabetes mellitus without mention of complication, uncontrolled       hx of, hasn't taken meds for 5 years      Past Surgical History:   Procedure Laterality Date   • COLONOSCOPY - ENDO   10/23/2017     Procedure: COLONOSCOPY - ENDO;  Surgeon: Talon Amador M.D.;  Location: SURGERY [facility];  Service: Gastroenterology   • EXTENSOR TENDON REPAIR Left 11/10/2016     Procedure: EXTENSOR TENDON REPAIR - RING FINGER;  Surgeon: Philip De La Cruz M.D.;  Location: SURGERY [facility];  Service:    • HYSTEROSCOPY WITH VIDEO OPERATIVE   8/20/2009     Performed by SHERRI ELI at SURGERY SAME DAY [facility]   • DILATION AND CURETTAGE   8/20/2009     " "Performed by SHERRI ELI at SURGERY SAME DAY [facility]   • VEIN STRIPPING   2004     LT   • TONSILLECTOMY   1968       Family History         Family History   Problem Relation Age of Onset   • No Known Problems Mother     • Heart Disease Father     • Diabetes Father              Social History       Social History   Substance Use Topics   • Smoking status: Never Smoker   • Smokeless tobacco: Never Used   • Alcohol use No     Allergies: No known drug allergies    Current Outpatient Prescriptions on File Prior to Encounter   Medication Sig Dispense Refill   • tramadol (ULTRAM) 50 MG Tab Take 1 Tab by mouth every 8 hours as needed for Moderate Pain. 30 Tab 0   • furosemide (LASIX) 40 MG Tab Take 40 mg by mouth every day.       • enoxaparin (LOVENOX) 100 MG/ML Solution inj Inject 100 mg as instructed every 12 hours. 60 Syringe 2   • Multiple Vitamins-Minerals (VISION-EARNESTINE PRESERVE PO) Take 1 Tab by mouth every day.       • cephALEXin (KEFLEX) 500 MG Cap Take 500 mg by mouth 3 times a day. Unknown course started 10/18/17 for leg drainage          VITAL SIGNS:  BP (!) 86/27   Pulse 89   Temp (!) 35.4 °C (95.8 °F)   Resp 15   Ht 1.727 m (5' 8\")   Wt 114.1 kg (251 lb 8.7 oz)   SpO2 94%   BMI 38.25 kg/m²     PHYSICAL EXAMINATION:  Jaundice or scleral icterus, mucous membranes dry  Chest clear bilateral with diminished bases  Heart regular without obvious murmur. Pulses thready in the bilateral upper extremities  Abdomen is obese with significant right upper quadrant fullness with tenderness over the right upper quadrant. There is no overt distention. No rebound or guarding. No scars or hernias.  Extremities have no cyanosis clubbing but there is mild diffuse edema  There are no focal neurologic findings the patient is somewhat obtunded and requires significant interaction to awaken.    LABORATORY STUDIES:  White count 24.4 hemoglobin 8.9 platelet count 210 absolute neutrophil count 20.01  Sodium 1:30 " potassium 5.3 chloride 94 CO2 13 and 23 glucose 80 BUN 59 creatinine 2.80 calcium 6.8  AST 2843  alkaline phosphatase 7052 bilirubin 8.8 albumin 2.3  CPK 1277  Ammonia 93  Lactic acid 5.7    10/23 INR was 1.52    DIAGNOSTIC STUDIES:  Portable chest x-ray: 1.  Left central line noted with tip at lower end of the SVC. No pneumothorax identified.     Ultrasound abdomen:  1.  Heterogeneous liver containing multiple solid masses which are suspicious for metastases. These lesions were also identified on the prior CT.  2.  Small amount of ascites is identified.  3. No gallbladder wall thickening or pericholecystic fluid, no common bile duct dilatation    10/20 CT abdomen:  1.  Wall thickening of the ascending colon is highly suspicious for colonic malignancy.  2.  Hepatomegaly with multiple coalescing hypodense rim enhancing masses is consistent with metastatic disease.  3.  Omental nodularity with mesenteric and pericolonic masses are consistent with carcinomatosis.  4.  Small amount of perihepatic and pelvic ascites.  5.  Retrocrural, retroperitoneal, and periportal adenopathy is consistent with metastasis.    10/20 CT chest:  1.  Right lower lobe pulmonary embolus. No CT evidence for right ventricular strain.  2.  Multiple scattered bilateral pulmonary nodules are highly suspicious for metastatic disease.  3.  Right hilar adenopathy is suspicious for metastatic disease.    ASSESSMENT AND PLAN:  67-year-old female with stage IV colon cancer with profound tumor load in the liver with multiple metastases to lungs who presents with worsening abdominal pain, septic picture and signs of hepatocellular dysfunction. Patient is being admitted by the medical service. She requires workup finding infectious source and significant resuscitation. Surgical service will assist in management by placing central line. Given the patient's presentation and the advanced stage of her baseline disease, I do not think there is much that  the surgical service could offer in this case. Recommend medical support to attempt to correct her metabolic derangements. Patient and family would probably benefit from a consultation by the palliative care service. If ongoing workup identifies a surgical source, I think that given the patient's state, risk of further morbidity and death from surgery would preclude any intervention other than comfort care.     Please feel free to reconsult a surgical service if there is a new question or concern.    This note was dictated using voice recognition software and as such may have  inaccuracies that do not reflect patient care plan.

## 2017-10-26 NOTE — ASSESSMENT & PLAN NOTE
Severe hypotension requiring IV pressors and IV fluids.  Appears vasodistributive shock likely multifactorial: may be septic shock though may be due to metastatic cancer.   Broad spectrum antibiotics.  WBC up to 27.

## 2017-10-26 NOTE — PROGRESS NOTES
0145: Dr. Linton called to update on pt's status: Vasopressin initiated, Levophed increased, no urine output; received orders for Hydrocortisone (see MAR for details); send Cortisol level; and initiate Haider-synephrine if needed. Hold Heparin infusion for now due to APTT results.

## 2017-10-26 NOTE — CARE PLAN
Problem: Infection  Goal: Will remain free from infection  Outcome: PROGRESSING SLOWER THAN EXPECTED  Pt with elevated WBC; hypotension; receiving IV antibiotics and vasopressor therapy    Problem: Venous Thromboembolism (VTW)/Deep Vein Thrombosis (DVT) Prevention:  Goal: Patient will participate in Venous Thrombosis (VTE)/Deep Vein Thrombosis (DVT)Prevention Measures  Outcome: PROGRESSING AS EXPECTED  Heparin infusion ordered to begin at 0500

## 2017-10-27 NOTE — DISCHARGE SUMMARY
DATE OF ADMISSION:  10/25/2017.    DATE OF DEATH:  10/26/2017.    CAUSE OF DEATH:  Vaso distributive shock secondary to metastatic colon cancer.    MAJOR COMORBID CONDITIONS:  1.  Acute respiratory failure.  2.  Acute liver failure.  3.  Acute renal failure.  4.  Acute severe encephalopathy.  5.  Metastatic colon carcinoma.  6.  Recent deep venous thrombosis and pulmonary embolism.  7.  Severe protein-calorie malnutrition.    CONSULTATIONS:  Dr. Rios, oncology, was consulted.  Dr. Brandon, surgery,   was consulted.    LABORATORY DATA:  White blood cell count today is 27,000, hemoglobin is 8.9,   creatinine is 3.24, AST is 2803.  Blood cultures negative x2.    HOSPITAL COURSE:  On 10/25/2017, this 67-year-old female, patient of Manju Diaz, physician assistant, presented with weakness.  She had just been   discharged from [facility], had a biopsy revealing adenocarcinoma.  She   presented with significant hypotension requiring multiple intravenous   pressors.  She was given IV antibiotics.  There was no evidence of sepsis.    She was treated empirically.  Her condition worsened.  She had multi-organ   failure as noted above.  Today, Dr. Rios consulted, recommending comfort   care.  I met with family at length.  Decision was made for comfort care.    These measures were carried out and she  in comfort and with dignity   secondary to multi-organ failure from metastatic colon cancer.       ____________________________________     MD LOUIE VASQUEZ / EVANGELISTA    DD:  10/26/2017 18:18:43  DT:  10/26/2017 20:03:02    D#:  [id number]  Job#:  [id number]

## 2017-10-28 LAB
BACTERIA UR CULT: NORMAL
SIGNIFICANT IND 70042: NORMAL
SITE SITE: NORMAL
SOURCE SOURCE: NORMAL

## 2017-10-30 LAB
BACTERIA BLD CULT: NORMAL
BACTERIA BLD CULT: NORMAL
SIGNIFICANT IND 70042: NORMAL
SIGNIFICANT IND 70042: NORMAL
SITE SITE: NORMAL
SITE SITE: NORMAL
SOURCE SOURCE: NORMAL
SOURCE SOURCE: NORMAL

## (undated) DEVICE — KIT  I.V. START (100EA/CA)

## (undated) DEVICE — SYRINGE SAFETY 3 ML 18 GA X 1 1/2 BLUNT LL (100/BX 8BX/CA)

## (undated) DEVICE — SENSOR SPO2 ADULT LNCS ADTX (20/BX) ORDER ITEM #19593

## (undated) DEVICE — CANNULA W/ SUPPLY TUBING O2 - (50/CA)

## (undated) DEVICE — SET EXTENSION WITH 2 PORTS (48EA/CA) ***PART #2C8610 IS A SUBSTITUTE*****

## (undated) DEVICE — TRAP POLYP E-TRAP (25EA/BX)

## (undated) DEVICE — PAD PREP 24 X 48 CUFFED - (100/CA)

## (undated) DEVICE — SPONGE GAUZE NON-STERILE 4X4 - (2000/CA 10PK/CA)

## (undated) DEVICE — ELECTRODE 850 FOAM ADHESIVE - HYDROGEL RADIOTRNSPRNT (50/PK)

## (undated) DEVICE — LACTATED RINGERS INJ 1000 ML - (14EA/CA 60CA/PF)

## (undated) DEVICE — GOWN SURGEONS LARGE - (32/CA)

## (undated) DEVICE — BASIN EMESIS DISP. - (250/CA)

## (undated) DEVICE — SYRINGE SAFETY 5 ML 18 GA X 1-1/2 BLUNT LL (100/BX 4BX/CA)

## (undated) DEVICE — TUBING CLEARLINK DUO-VENT - C-FLO (48EA/CA)

## (undated) DEVICE — SYRINGE DISP. 50CC LS - (40/BX)

## (undated) DEVICE — CANISTER SUCTION RIGID RED 1500CC (40EA/CA)